# Patient Record
Sex: MALE | HISPANIC OR LATINO | Employment: FULL TIME | URBAN - METROPOLITAN AREA
[De-identification: names, ages, dates, MRNs, and addresses within clinical notes are randomized per-mention and may not be internally consistent; named-entity substitution may affect disease eponyms.]

---

## 2018-05-05 ENCOUNTER — HOSPITAL ENCOUNTER (OUTPATIENT)
Facility: MEDICAL CENTER | Age: 23
End: 2018-05-06
Attending: EMERGENCY MEDICINE | Admitting: OTOLARYNGOLOGY
Payer: COMMERCIAL

## 2018-05-05 ENCOUNTER — APPOINTMENT (OUTPATIENT)
Dept: RADIOLOGY | Facility: MEDICAL CENTER | Age: 23
End: 2018-05-05
Attending: EMERGENCY MEDICINE
Payer: COMMERCIAL

## 2018-05-05 DIAGNOSIS — D72.829 LEUKOCYTOSIS, UNSPECIFIED TYPE: ICD-10-CM

## 2018-05-05 DIAGNOSIS — G89.18 POST-OP PAIN: ICD-10-CM

## 2018-05-05 DIAGNOSIS — J02.9 ACUTE SORE THROAT: ICD-10-CM

## 2018-05-05 DIAGNOSIS — J36 PERITONSILLAR ABSCESS: ICD-10-CM

## 2018-05-05 LAB
ANION GAP SERPL CALC-SCNC: 14 MMOL/L (ref 0–11.9)
APTT PPP: 34 SEC (ref 24.7–36)
BASOPHILS # BLD AUTO: 0.4 % (ref 0–1.8)
BASOPHILS # BLD: 0.06 K/UL (ref 0–0.12)
BUN SERPL-MCNC: 11 MG/DL (ref 8–22)
CALCIUM SERPL-MCNC: 9.5 MG/DL (ref 8.5–10.5)
CHLORIDE SERPL-SCNC: 105 MMOL/L (ref 96–112)
CO2 SERPL-SCNC: 22 MMOL/L (ref 20–33)
CREAT SERPL-MCNC: 0.67 MG/DL (ref 0.5–1.4)
EOSINOPHIL # BLD AUTO: 0.01 K/UL (ref 0–0.51)
EOSINOPHIL NFR BLD: 0.1 % (ref 0–6.9)
ERYTHROCYTE [DISTWIDTH] IN BLOOD BY AUTOMATED COUNT: 39.5 FL (ref 35.9–50)
GLUCOSE SERPL-MCNC: 90 MG/DL (ref 65–99)
HCT VFR BLD AUTO: 41.8 % (ref 42–52)
HGB BLD-MCNC: 14.4 G/DL (ref 14–18)
IMM GRANULOCYTES # BLD AUTO: 0.06 K/UL (ref 0–0.11)
IMM GRANULOCYTES NFR BLD AUTO: 0.4 % (ref 0–0.9)
INR PPP: 1.11 (ref 0.87–1.13)
LYMPHOCYTES # BLD AUTO: 1.03 K/UL (ref 1–4.8)
LYMPHOCYTES NFR BLD: 6.4 % (ref 22–41)
MCH RBC QN AUTO: 31 PG (ref 27–33)
MCHC RBC AUTO-ENTMCNC: 34.4 G/DL (ref 33.7–35.3)
MCV RBC AUTO: 90.1 FL (ref 81.4–97.8)
MONOCYTES # BLD AUTO: 1.07 K/UL (ref 0–0.85)
MONOCYTES NFR BLD AUTO: 6.6 % (ref 0–13.4)
NEUTROPHILS # BLD AUTO: 13.99 K/UL (ref 1.82–7.42)
NEUTROPHILS NFR BLD: 86.1 % (ref 44–72)
NRBC # BLD AUTO: 0 K/UL
NRBC BLD-RTO: 0 /100 WBC
PLATELET # BLD AUTO: 383 K/UL (ref 164–446)
PMV BLD AUTO: 8.8 FL (ref 9–12.9)
POTASSIUM SERPL-SCNC: 3.8 MMOL/L (ref 3.6–5.5)
PROTHROMBIN TIME: 14 SEC (ref 12–14.6)
RBC # BLD AUTO: 4.64 M/UL (ref 4.7–6.1)
S PYO AG THROAT QL: NORMAL
SIGNIFICANT IND 70042: NORMAL
SITE SITE: NORMAL
SODIUM SERPL-SCNC: 141 MMOL/L (ref 135–145)
SOURCE SOURCE: NORMAL
WBC # BLD AUTO: 16.2 K/UL (ref 4.8–10.8)

## 2018-05-05 PROCEDURE — 501445 HCHG STAPLER, SKIN DISP: Performed by: OTOLARYNGOLOGY

## 2018-05-05 PROCEDURE — A9270 NON-COVERED ITEM OR SERVICE: HCPCS

## 2018-05-05 PROCEDURE — 700101 HCHG RX REV CODE 250

## 2018-05-05 PROCEDURE — 700111 HCHG RX REV CODE 636 W/ 250 OVERRIDE (IP)

## 2018-05-05 PROCEDURE — 96375 TX/PRO/DX INJ NEW DRUG ADDON: CPT

## 2018-05-05 PROCEDURE — 700102 HCHG RX REV CODE 250 W/ 637 OVERRIDE(OP): Performed by: OTOLARYNGOLOGY

## 2018-05-05 PROCEDURE — 99291 CRITICAL CARE FIRST HOUR: CPT

## 2018-05-05 PROCEDURE — 110454 HCHG SHELL REV 250: Performed by: OTOLARYNGOLOGY

## 2018-05-05 PROCEDURE — 80048 BASIC METABOLIC PNL TOTAL CA: CPT

## 2018-05-05 PROCEDURE — 700105 HCHG RX REV CODE 258: Performed by: OTOLARYNGOLOGY

## 2018-05-05 PROCEDURE — 700105 HCHG RX REV CODE 258: Performed by: EMERGENCY MEDICINE

## 2018-05-05 PROCEDURE — 85610 PROTHROMBIN TIME: CPT

## 2018-05-05 PROCEDURE — 500123 HCHG BOVIE, CONTROL W/BLADE: Performed by: OTOLARYNGOLOGY

## 2018-05-05 PROCEDURE — 36415 COLL VENOUS BLD VENIPUNCTURE: CPT

## 2018-05-05 PROCEDURE — 160048 HCHG OR STATISTICAL LEVEL 1-5: Performed by: OTOLARYNGOLOGY

## 2018-05-05 PROCEDURE — 85730 THROMBOPLASTIN TIME PARTIAL: CPT

## 2018-05-05 PROCEDURE — 501838 HCHG SUTURE GENERAL: Performed by: OTOLARYNGOLOGY

## 2018-05-05 PROCEDURE — 85025 COMPLETE CBC W/AUTO DIFF WBC: CPT

## 2018-05-05 PROCEDURE — 500122 HCHG BOVIE, BLADE: Performed by: OTOLARYNGOLOGY

## 2018-05-05 PROCEDURE — 87880 STREP A ASSAY W/OPTIC: CPT

## 2018-05-05 PROCEDURE — 96365 THER/PROPH/DIAG IV INF INIT: CPT

## 2018-05-05 PROCEDURE — 700111 HCHG RX REV CODE 636 W/ 250 OVERRIDE (IP): Performed by: OTOLARYNGOLOGY

## 2018-05-05 PROCEDURE — G0378 HOSPITAL OBSERVATION PER HR: HCPCS

## 2018-05-05 PROCEDURE — A9270 NON-COVERED ITEM OR SERVICE: HCPCS | Performed by: OTOLARYNGOLOGY

## 2018-05-05 PROCEDURE — 160036 HCHG PACU - EA ADDL 30 MINS PHASE I: Performed by: OTOLARYNGOLOGY

## 2018-05-05 PROCEDURE — 700111 HCHG RX REV CODE 636 W/ 250 OVERRIDE (IP): Performed by: EMERGENCY MEDICINE

## 2018-05-05 PROCEDURE — 160027 HCHG SURGERY MINUTES - 1ST 30 MINS LEVEL 2: Performed by: OTOLARYNGOLOGY

## 2018-05-05 PROCEDURE — 160009 HCHG ANES TIME/MIN: Performed by: OTOLARYNGOLOGY

## 2018-05-05 PROCEDURE — 502573 HCHG PACK, ENT: Performed by: OTOLARYNGOLOGY

## 2018-05-05 PROCEDURE — 96376 TX/PRO/DX INJ SAME DRUG ADON: CPT

## 2018-05-05 PROCEDURE — 160002 HCHG RECOVERY MINUTES (STAT): Performed by: OTOLARYNGOLOGY

## 2018-05-05 PROCEDURE — 303977 HCHG I & D

## 2018-05-05 PROCEDURE — 70491 CT SOFT TISSUE NECK W/DYE: CPT

## 2018-05-05 PROCEDURE — 700117 HCHG RX CONTRAST REV CODE 255: Performed by: EMERGENCY MEDICINE

## 2018-05-05 PROCEDURE — 88304 TISSUE EXAM BY PATHOLOGIST: CPT | Mod: 59

## 2018-05-05 PROCEDURE — 96374 THER/PROPH/DIAG INJ IV PUSH: CPT

## 2018-05-05 PROCEDURE — 160035 HCHG PACU - 1ST 60 MINS PHASE I: Performed by: OTOLARYNGOLOGY

## 2018-05-05 PROCEDURE — 700102 HCHG RX REV CODE 250 W/ 637 OVERRIDE(OP)

## 2018-05-05 PROCEDURE — 87081 CULTURE SCREEN ONLY: CPT

## 2018-05-05 PROCEDURE — 160038 HCHG SURGERY MINUTES - EA ADDL 1 MIN LEVEL 2: Performed by: OTOLARYNGOLOGY

## 2018-05-05 RX ORDER — ACETAMINOPHEN 325 MG/1
650 TABLET ORAL EVERY 4 HOURS PRN
Status: DISCONTINUED | OUTPATIENT
Start: 2018-05-05 | End: 2018-05-06 | Stop reason: HOSPADM

## 2018-05-05 RX ORDER — OXYCODONE HYDROCHLORIDE AND ACETAMINOPHEN 5; 325 MG/1; MG/1
TABLET ORAL
Status: COMPLETED
Start: 2018-05-05 | End: 2018-05-05

## 2018-05-05 RX ORDER — DEXAMETHASONE SODIUM PHOSPHATE 4 MG/ML
10 INJECTION, SOLUTION INTRA-ARTICULAR; INTRALESIONAL; INTRAMUSCULAR; INTRAVENOUS; SOFT TISSUE EVERY 8 HOURS
Status: DISPENSED | OUTPATIENT
Start: 2018-05-05 | End: 2018-05-06

## 2018-05-05 RX ORDER — MORPHINE SULFATE 4 MG/ML
4 INJECTION, SOLUTION INTRAMUSCULAR; INTRAVENOUS ONCE
Status: COMPLETED | OUTPATIENT
Start: 2018-05-05 | End: 2018-05-05

## 2018-05-05 RX ORDER — OXYCODONE HYDROCHLORIDE 10 MG/1
10 TABLET ORAL EVERY 4 HOURS PRN
Status: DISCONTINUED | OUTPATIENT
Start: 2018-05-05 | End: 2018-05-06 | Stop reason: HOSPADM

## 2018-05-05 RX ORDER — ONDANSETRON 2 MG/ML
4 INJECTION INTRAMUSCULAR; INTRAVENOUS ONCE
Status: COMPLETED | OUTPATIENT
Start: 2018-05-05 | End: 2018-05-05

## 2018-05-05 RX ORDER — OXYCODONE HYDROCHLORIDE 5 MG/1
5 TABLET ORAL EVERY 4 HOURS PRN
Status: DISCONTINUED | OUTPATIENT
Start: 2018-05-05 | End: 2018-05-06 | Stop reason: HOSPADM

## 2018-05-05 RX ORDER — MORPHINE SULFATE 4 MG/ML
4 INJECTION, SOLUTION INTRAMUSCULAR; INTRAVENOUS
Status: DISCONTINUED | OUTPATIENT
Start: 2018-05-05 | End: 2018-05-06 | Stop reason: HOSPADM

## 2018-05-05 RX ORDER — LIDOCAINE HYDROCHLORIDE AND EPINEPHRINE BITARTRATE 20; .01 MG/ML; MG/ML
10 INJECTION, SOLUTION SUBCUTANEOUS ONCE
Status: COMPLETED | OUTPATIENT
Start: 2018-05-05 | End: 2018-05-05

## 2018-05-05 RX ORDER — IBUPROFEN 600 MG/1
600 TABLET ORAL EVERY 6 HOURS PRN
Status: DISCONTINUED | OUTPATIENT
Start: 2018-05-05 | End: 2018-05-06 | Stop reason: HOSPADM

## 2018-05-05 RX ORDER — SODIUM CHLORIDE 9 MG/ML
1000 INJECTION, SOLUTION INTRAVENOUS ONCE
Status: COMPLETED | OUTPATIENT
Start: 2018-05-05 | End: 2018-05-05

## 2018-05-05 RX ORDER — DEXAMETHASONE SODIUM PHOSPHATE 10 MG/ML
10 INJECTION, SOLUTION INTRAMUSCULAR; INTRAVENOUS ONCE
Status: COMPLETED | OUTPATIENT
Start: 2018-05-05 | End: 2018-05-05

## 2018-05-05 RX ORDER — OXYCODONE HYDROCHLORIDE AND ACETAMINOPHEN 5; 325 MG/1; MG/1
1 TABLET ORAL EVERY 4 HOURS PRN
Status: DISCONTINUED | OUTPATIENT
Start: 2018-05-05 | End: 2018-05-05

## 2018-05-05 RX ORDER — ONDANSETRON 2 MG/ML
4 INJECTION INTRAMUSCULAR; INTRAVENOUS EVERY 8 HOURS PRN
Status: DISCONTINUED | OUTPATIENT
Start: 2018-05-05 | End: 2018-05-06 | Stop reason: HOSPADM

## 2018-05-05 RX ORDER — CEFTRIAXONE 2 G/1
2 INJECTION, POWDER, FOR SOLUTION INTRAMUSCULAR; INTRAVENOUS ONCE
Status: COMPLETED | OUTPATIENT
Start: 2018-05-05 | End: 2018-05-05

## 2018-05-05 RX ORDER — LIDOCAINE HYDROCHLORIDE AND EPINEPHRINE BITARTRATE 20; .01 MG/ML; MG/ML
INJECTION, SOLUTION SUBCUTANEOUS
Status: COMPLETED
Start: 2018-05-05 | End: 2018-05-05

## 2018-05-05 RX ORDER — SODIUM CHLORIDE, SODIUM LACTATE, POTASSIUM CHLORIDE, CALCIUM CHLORIDE 600; 310; 30; 20 MG/100ML; MG/100ML; MG/100ML; MG/100ML
INJECTION, SOLUTION INTRAVENOUS CONTINUOUS
Status: DISCONTINUED | OUTPATIENT
Start: 2018-05-05 | End: 2018-05-06 | Stop reason: HOSPADM

## 2018-05-05 RX ORDER — OXYMETAZOLINE HYDROCHLORIDE 0.05 G/100ML
SPRAY NASAL
Status: DISCONTINUED | OUTPATIENT
Start: 2018-05-05 | End: 2018-05-05 | Stop reason: HOSPADM

## 2018-05-05 RX ADMIN — MORPHINE SULFATE 4 MG: 4 INJECTION INTRAVENOUS at 08:08

## 2018-05-05 RX ADMIN — HYDROMORPHONE HYDROCHLORIDE 0.5 MG: 10 INJECTION, SOLUTION INTRAMUSCULAR; INTRAVENOUS; SUBCUTANEOUS at 14:30

## 2018-05-05 RX ADMIN — SODIUM CHLORIDE 1000 ML: 9 INJECTION, SOLUTION INTRAVENOUS at 08:09

## 2018-05-05 RX ADMIN — SODIUM CHLORIDE, POTASSIUM CHLORIDE, SODIUM LACTATE AND CALCIUM CHLORIDE: 600; 310; 30; 20 INJECTION, SOLUTION INTRAVENOUS at 20:12

## 2018-05-05 RX ADMIN — ONDANSETRON 4 MG: 2 INJECTION, SOLUTION INTRAMUSCULAR; INTRAVENOUS at 08:08

## 2018-05-05 RX ADMIN — DEXAMETHASONE SODIUM PHOSPHATE 10 MG: 10 INJECTION, SOLUTION INTRAMUSCULAR; INTRAVENOUS at 08:43

## 2018-05-05 RX ADMIN — HYDROMORPHONE HYDROCHLORIDE 0.5 MG: 10 INJECTION, SOLUTION INTRAMUSCULAR; INTRAVENOUS; SUBCUTANEOUS at 14:37

## 2018-05-05 RX ADMIN — LIDOCAINE HYDROCHLORIDE AND EPINEPHRINE BITARTRATE 10 ML: 20; .01 INJECTION, SOLUTION SUBCUTANEOUS at 10:00

## 2018-05-05 RX ADMIN — OXYCODONE AND ACETAMINOPHEN 2 TABLET: 5; 325 TABLET ORAL at 14:25

## 2018-05-05 RX ADMIN — CEFTRIAXONE SODIUM 2 G: 2 INJECTION, POWDER, FOR SOLUTION INTRAMUSCULAR; INTRAVENOUS at 08:08

## 2018-05-05 RX ADMIN — AMPICILLIN SODIUM AND SULBACTAM SODIUM 3 G: 2; 1 INJECTION, POWDER, FOR SOLUTION INTRAMUSCULAR; INTRAVENOUS at 18:17

## 2018-05-05 RX ADMIN — IOHEXOL 80 ML: 350 INJECTION, SOLUTION INTRAVENOUS at 09:39

## 2018-05-05 RX ADMIN — OXYCODONE HYDROCHLORIDE 5 MG: 10 TABLET ORAL at 18:23

## 2018-05-05 RX ADMIN — LIDOCAINE HYDROCHLORIDE AND EPINEPHRINE 10 ML: 20; 10 INJECTION, SOLUTION INFILTRATION; PERINEURAL at 10:00

## 2018-05-05 RX ADMIN — DEXAMETHASONE SODIUM PHOSPHATE 10 MG: 4 INJECTION, SOLUTION INTRAMUSCULAR; INTRAVENOUS at 22:00

## 2018-05-05 ASSESSMENT — PATIENT HEALTH QUESTIONNAIRE - PHQ9
1. LITTLE INTEREST OR PLEASURE IN DOING THINGS: NOT AT ALL
SUM OF ALL RESPONSES TO PHQ9 QUESTIONS 1 AND 2: 0
2. FEELING DOWN, DEPRESSED, IRRITABLE, OR HOPELESS: NOT AT ALL

## 2018-05-05 ASSESSMENT — PAIN SCALES - GENERAL
PAINLEVEL_OUTOF10: 0
PAINLEVEL_OUTOF10: 6
PAINLEVEL_OUTOF10: 6
PAINLEVEL_OUTOF10: 10
PAINLEVEL_OUTOF10: 8
PAINLEVEL_OUTOF10: 0
PAINLEVEL_OUTOF10: 9
PAINLEVEL_OUTOF10: 5
PAINLEVEL_OUTOF10: 8
PAINLEVEL_OUTOF10: 3

## 2018-05-05 ASSESSMENT — PAIN SCALES - WONG BAKER
WONGBAKER_NUMERICALRESPONSE: HURTS JUST A LITTLE BIT
WONGBAKER_NUMERICALRESPONSE: HURTS A LITTLE MORE

## 2018-05-05 ASSESSMENT — LIFESTYLE VARIABLES
EVER_SMOKED: YES
DO YOU DRINK ALCOHOL: NO

## 2018-05-05 NOTE — OR NURSING
Pt awake, no complaints of nausea, tolerating fluids. States pain is tolerable. Unable to locate family, Pt calling them himself. All belongings accounted for, all questions answered at this time. Report called.

## 2018-05-05 NOTE — ED TRIAGE NOTES
"Keith Elaine  Chief Complaint   Patient presents with   • Sore Throat     s/s since last thursday. pain rated at 10/10. pt report severe swelling and unable to talk d/t pain.    • Difficulty Swallowing     pt report difficulty swallowing d/t pain.      Pt ambulatory to triage with above complaint. Pt using pen and paper to communicate in triage. Pt states, \"it started last week with a cold and by Thursday last week the swelling began\". Pt given emesis bag in triage to spit in d/t inability to swallow.     /60   Pulse 85   Temp 35.9 °C (96.7 °F)   Resp 14   Ht 1.676 m (5' 6\")   Wt 70.7 kg (155 lb 13.8 oz)   SpO2 97%   BMI 25.16 kg/m²     Pt informed of triage process and encouraged to notify staff of any changes or concerns. Pt verbalized understanding of instructions. Pt placed back in lobby.     "

## 2018-05-05 NOTE — OP REPORT
DATE OF SERVICE:  05/05/2018    PREOPERATIVE DIAGNOSIS:  Right tonsil abscess.    POSTOPERATIVE DIAGNOSIS:  Tonsillitis.    PROCEDURE:  Bilateral tonsillectomy.    SURGEON:  Miguel Angel Sparks MD    ASSISTANT:  None.    ANESTHESIA:  General endotracheal.    ESTIMATED BLOOD LOSS:  25 mL.    COMPLICATIONS:  None.    SPECIMENS:  Right and left tonsils sent in RPMI in formalin.    COMPLICATIONS:  None.    INDICATIONS:  A 22-year-old gentleman who has had 10 days of severe sore   throat.  He presented to the emergency room this morning.  CT scan revealed a   3 cm hypodensity within the right tonsil.  I attempted drainage of this at the   bedside, but I was unable to encounter pus.  After discussing the risks,   benefits, and alternatives, he elected to undergo the above procedure.  I did   consent them for incision and drainage of right peritonsillar abscess as well   as possible bilateral quinsy tonsillectomy.  Risks, benefits, and alternatives   discussed including bleeding, infection, recurrence, need for further   surgery, injury to the teeth, lips, gums and tongue and to the carotid artery,   pain afterwards.    FINDINGS:  The right tonsil was very enlarged.  There was some necrosis on the   medial aspect of it.  When I was unable to express pus, using an 18-gauge   needle on a 10 mL syringe, I continued my incision which I made previously in   the emergency room.  I was able to get into the plane between the tonsil and   the constrictor muscles.  This was very adherent and scarred and eventually I   was able to encounter a very large empty pocket just posterior to the tonsil,   which I think corresponded to probably the hypodensity on the CT, but I did   not encounter tim pus.  At this point, I elected to remove the entire tonsil   as the left tonsil as well.  I did sent them fresh for lymphoma workup and we   discussed how to send it with Dr. Pinky Kaye who was on call.    DESCRIPTION OF PROCEDURE:  Patient  was brought to the operating room,   correctly identified as the patient, intubated by anesthesia without   difficulty, turned 90 degrees.  He has already been given antibiotics in the   emergency room.  He was draped in usual sterile fashion.  A time-out was   performed, I could see my devan on his right neck.  I began by using #3 mouth   gag to open the mouth.  I placed him in suspension on towels.  The right   tonsil was very enlarged and bulging.  The uvula was edematous.  I began by   exploring my incision using a Schnidt.  I could not encounter any pus, so I   used an 18-gauge needle on a 10 mL syringe and the soft palate and the tonsil   itself without encountering any pus. A t this point, I elected to proceed with   her quinsy tonsillectomy since the CT findings were still consistent with   abscess.  I continued my incision inferiorly and brought it around   superomedially, and then I was able to encounter the plane between the   constrictors on the tonsil.  This is very fibrotic and scarred in.  I   continued dissecting out and then I got into a pocket just posterior to the   tonsil that likely corresponds to the hypodensity on the scan, but there was   no pus within it.  I then continued to resect the tonsil.  The tonsil came   out.  There was some residual tonsillar tissue left superiorly as well as   inferiorly, but it was very oozy, and I did not feel like there was a residual   abscess within this.  I sent this to pathology.  We called Dr. Kaye and   she started to instruct us on how to save it for flow cytometry as well as   traditional histopathology.  I palpated, it did not feel like there was a 3 cm   abscess present.  Still, I think the hypodensity corresponded to an area of   just necrosis within the tonsil itself or just posterior to the tonsil.  I   then elected to proceed with left tonsillectomy.  I grasped the left tonsil,   dissected on the usual plane without difficulty using  electrocautery and a   coag of 15.  I sent this to pathology as well.  The uvula was quite edematous   preoperatively and it continued to be throughout the operation.  I then   obtained hemostasis using the suction cautery on a coag of 15 and 20.  I then   placed some Surgiflo in the tonsillar fossa.  I rubbed the tonsillar fossa   multiple times, took him in and out of suspension and any residual oozing was   suction cauterized.  Patient was then awakened from anesthesia and was taken   to recovery room in stable condition.  All counts were correct.  There were no   complications.  His teeth were in the same postoperative as preoperative   condition.       ____________________________________     MD LIANG Salcido / ISSA    DD:  05/05/2018 13:58:32  DT:  05/05/2018 14:23:11    D#:  [id number]  Job#:  [id number]

## 2018-05-05 NOTE — CONSULTS
"Date of Service:  5/5/18    PCP: No primary care provider on file.    CC:  Right PTA     HPI: This is a 22 y.o. male with 10 days of sore throat. No rx. No prev tonsillitis.  Smokes and drinks occasionally, no drugs.   Pain with swallowing, has some SOB, able to lay flat. Voice is muffled. Had water last night, is NPO  Received decadron, ctx      ROS: As above. The remainder of a complete review of systems is negative in all systems except as noted.    PMHx:  Active Ambulatory Problems     Diagnosis Date Noted   • No Active Ambulatory Problems     Resolved Ambulatory Problems     Diagnosis Date Noted   • No Resolved Ambulatory Problems     No Additional Past Medical History       SHx:  Social History     Social History   • Marital status: Single     Spouse name: N/A   • Number of children: N/A   • Years of education: N/A     Occupational History   • Not on file.     Social History Main Topics   • Smoking status: Not on file   • Smokeless tobacco: Not on file   • Alcohol use Not on file   • Drug use: Unknown   • Sexual activity: Not on file     Other Topics Concern   • Not on file     Social History Narrative   • No narrative on file       FHx:  family history is not on file.    Allergies:  No Known Allergies    Medications:  No current facility-administered medications on file prior to encounter.      No current outpatient prescriptions on file prior to encounter.       Objective Exam:  Vitals:    05/05/18 0629 05/05/18 0633 05/05/18 0843   BP: 100/60  112/52   Pulse: 85  76   Resp: 14  15   Temp: 35.9 °C (96.7 °F)     SpO2: 97%  98%   Weight:  70.7 kg (155 lb 13.8 oz)    Height: 1.676 m (5' 6\")         General: NAD, breathing comfortably, no stridor  Neck flat full rom   oP: 3 finger trismus, large bulge in right tonsil   FOM normal   A+O x 3    Laboratory--reviewed personally and are as follows:  Lab Results   Component Value Date/Time    WBC 16.2 (H) 05/05/2018 08:11 AM    RBC 4.64 (L) 05/05/2018 08:11 AM    " HEMOGLOBIN 14.4 05/05/2018 08:11 AM    HEMATOCRIT 41.8 (L) 05/05/2018 08:11 AM    MCV 90.1 05/05/2018 08:11 AM    MCH 31.0 05/05/2018 08:11 AM    MCHC 34.4 05/05/2018 08:11 AM    MPV 8.8 (L) 05/05/2018 08:11 AM    NEUTSPOLYS 86.10 (H) 05/05/2018 08:11 AM    LYMPHOCYTES 6.40 (L) 05/05/2018 08:11 AM    MONOCYTES 6.60 05/05/2018 08:11 AM    EOSINOPHILS 0.10 05/05/2018 08:11 AM    BASOPHILS 0.40 05/05/2018 08:11 AM      Lab Results   Component Value Date/Time    SODIUM 141 05/05/2018 08:11 AM    POTASSIUM 3.8 05/05/2018 08:11 AM    CHLORIDE 105 05/05/2018 08:11 AM    CO2 22 05/05/2018 08:11 AM    GLUCOSE 90 05/05/2018 08:11 AM    BUN 11 05/05/2018 08:11 AM    CREATININE 0.67 05/05/2018 08:11 AM      Lab Results   Component Value Date/Time    PROTHROMBTM 14.0 05/05/2018 08:11 AM    INR 1.11 05/05/2018 08:11 AM        Radiology  CT neck reviewed   Right tonsil abscess 3cm  Right edema of airway   Retropharyngeal space looks ok, I think the bubbles are due to the swelling, not spreading infection     Procedure:  After verbal and written consent and discussion of risks, benefits, alternatives consent signed  Risks - bleeding infection recurrence, need for further treatment, injury to teeth lips tongue gums, injury to carotid  Cetacaine sprayed on throat,   1cc of 2% lido with [id number] epi injected in right soft palate   Multiple passes with 20g needle unable to locate pus  11 blade used to incise right soft palate, merlyn used to spread - no pus   Tolerated well ebl 15cc     MDM:  22 y.o. male here with Right peritonsillar / tonsillar abscess   Unable to drain in ER  Will take to OR for I+D right PTA, possible faiza tonsillectomy  Risks, benefits, alternatives discussed  Consent signed   Will need admission for iv unasyn, decadron 10q8 x 3 doses   Npo

## 2018-05-05 NOTE — OR SURGEON
Immediate Post OP Note    PreOp Diagnosis: Right tonsil abscess    PostOp Diagnosis: Right tonsillitis     Procedure(s):  Bilateral TONSILLECTOMY - Wound Class: Clean Contaminated    Surgeon(s):  Miguel Angel Sparks M.D.    Anesthesiologist/Type of Anesthesia:  Anesthesiologist: Tad James M.D./General    Surgical Staff:  Circulator: Zachery Grande R.N.  Scrub Person: Demetrius Cuadra    Specimens removed if any:  Bilateral tonsils, sent in RPMI and formalin     Estimated Blood Loss: 25cc    Findings: Large right tonsil with cystic space posterior to it, no pus encountered     Complications: none        5/5/2018 1:50 PM Miguel Angel Sparks M.D.

## 2018-05-05 NOTE — ED PROVIDER NOTES
"ED Provider Note    Scribed for Jarret Bruner M.D. by Shivam Mazariegos. 5/5/2018  6:59 AM    Primary care provider: None noted  Means of arrival: Walk in  History obtained from: Patient  History limited by: None    CHIEF COMPLAINT  Chief Complaint   Patient presents with   • Sore Throat     s/s since last thursday. pain rated at 10/10. pt report severe swelling and unable to talk d/t pain.    • Difficulty Swallowing     pt report difficulty swallowing d/t pain.        HPI  Keith Elaine is a 22 y.o. male who presents to the Emergency Department complaining of sore throat and associated difficulty swallowing onset approximately 9 days ago. These symptoms are causing difficulty speaking secondary to the pain. He rates the pain as a 10/10 in severity. He does not report any significant alleviating factors to the pain. He otherwise does not report any other associated symptoms at this time. He does not report any associated shortness of breath.       REVIEW OF SYSTEMS  Pertinent positives include sore throat, difficulty swallowing.   Pertinent negatives include no shortness of breath.    All other systems reviewed and negative.    C    PAST MEDICAL HISTORY   None reported    SURGICAL HISTORY  patient denies any surgical history    SOCIAL HISTORY  None noted    FAMILY HISTORY  No pertinent family history reported.     CURRENT MEDICATIONS  Home Medications     Reviewed by Kelly Caro R.N. (Registered Nurse) on 05/05/18 at 0719  Med List Status: <None>   Medication Last Dose Status        Patient Chivo Taking any Medications                       ALLERGIES  No Known Allergies    PHYSICAL EXAM  VITAL SIGNS: /60   Pulse 85   Temp 35.9 °C (96.7 °F)   Resp 14   Ht 1.676 m (5' 6\")   Wt 70.7 kg (155 lb 13.8 oz)   SpO2 97%   BMI 25.16 kg/m²   Nursing note and vitals reviewed.  Constitutional: Well-developed and well-nourished. Uncomfortable appearing male.   HENT: Head is normocephalic and atraumatic. " Posterior pharynx is erythematous. Right sided tonsillar swelling with leftward uvula deviation. No exudate. Muffled voice.   Eyes: Pupils are equal, round, and reactive to light. Conjunctiva are normal.   Cardiovascular: Normal rate and regular rhythm. No murmur heard. Normal radial pulses.  Pulmonary/Chest: Breath sounds normal. No wheezes or rales.   Abdominal: Soft and non-tender. No distention    Musculoskeletal: Extremities exhibit normal range of motion without edema or tenderness.   Neurological: Awake, alert and oriented to person, place, and time. No focal deficits noted.  Skin: Skin is warm and dry. No rash.   Psychiatric: Normal mood and affect. Appropriate for clinical situation      DIAGNOSTIC STUDIES / PROCEDURES    LABS  Results for orders placed or performed during the hospital encounter of 05/05/18   RAPID STREP, CULT IF INDICATED (CULTURE IF NEGATIVE)   Result Value Ref Range    Significant Indicator NEG     Source THRT     Site THROAT     Rapid Strep Screen       Negative for Group A streptococcus.  A negative result may be obtained if the specimen is  inadequate or antigen concentration is below the  sensitivity of the test. This negative test will be followed  up with a culture as requested.     CBC WITH DIFFERENTIAL   Result Value Ref Range    WBC 16.2 (H) 4.8 - 10.8 K/uL    RBC 4.64 (L) 4.70 - 6.10 M/uL    Hemoglobin 14.4 14.0 - 18.0 g/dL    Hematocrit 41.8 (L) 42.0 - 52.0 %    MCV 90.1 81.4 - 97.8 fL    MCH 31.0 27.0 - 33.0 pg    MCHC 34.4 33.7 - 35.3 g/dL    RDW 39.5 35.9 - 50.0 fL    Platelet Count 383 164 - 446 K/uL    MPV 8.8 (L) 9.0 - 12.9 fL    Neutrophils-Polys 86.10 (H) 44.00 - 72.00 %    Lymphocytes 6.40 (L) 22.00 - 41.00 %    Monocytes 6.60 0.00 - 13.40 %    Eosinophils 0.10 0.00 - 6.90 %    Basophils 0.40 0.00 - 1.80 %    Immature Granulocytes 0.40 0.00 - 0.90 %    Nucleated RBC 0.00 /100 WBC    Neutrophils (Absolute) 13.99 (H) 1.82 - 7.42 K/uL    Lymphs (Absolute) 1.03 1.00 -  4.80 K/uL    Monos (Absolute) 1.07 (H) 0.00 - 0.85 K/uL    Eos (Absolute) 0.01 0.00 - 0.51 K/uL    Baso (Absolute) 0.06 0.00 - 0.12 K/uL    Immature Granulocytes (abs) 0.06 0.00 - 0.11 K/uL    NRBC (Absolute) 0.00 K/uL   BASIC METABOLIC PANEL   Result Value Ref Range    Sodium 141 135 - 145 mmol/L    Potassium 3.8 3.6 - 5.5 mmol/L    Chloride 105 96 - 112 mmol/L    Co2 22 20 - 33 mmol/L    Glucose 90 65 - 99 mg/dL    Bun 11 8 - 22 mg/dL    Creatinine 0.67 0.50 - 1.40 mg/dL    Calcium 9.5 8.5 - 10.5 mg/dL    Anion Gap 14.0 (H) 0.0 - 11.9   APTT   Result Value Ref Range    APTT 34.0 24.7 - 36.0 sec   PROTHROMBIN TIME   Result Value Ref Range    PT 14.0 12.0 - 14.6 sec    INR 1.11 0.87 - 1.13   ESTIMATED GFR   Result Value Ref Range    GFR If African American >60 >60 mL/min/1.73 m 2    GFR If Non African American >60 >60 mL/min/1.73 m 2      All labs reviewed by me.    RADIOLOGY  CT-SOFT TISSUE NECK WITH   Final Result      1.  Large right peritonsillar abscess measuring maximum of 3.7 cm      2.  Significant narrowing of the airway at the level of the oropharynx with associated mass effect upon the right side of the airway and shift to the left.      3.  Caudal extension of infection/inflammatory change including gas bubbles into the prevertebral space extending caudally to the level of the cricoid bone/C6      4.  Bilateral jugular chain reactive adenopathy      Findings were discussed with MARLENE RUTHERFORD on 5/5/2018 0940 AM.        The radiologist's interpretation of all radiological studies have been reviewed by me.       COURSE & MEDICAL DECISION MAKING  Nursing notes, VS, PMSFHx reviewed in chart.     Review of past medical records shows the patient has no prior ED visits.      6:59 AM - Patient seen and examined at bedside. Informed patient he has a peritonsillar abscess. Discussed plan of care which includes CT evaluation. Patient understands and agrees. Patient will be treated with morphine 4 mg, zofran 4 mg,  decadron 10 mg, rocephin 2 g. Ordered CT soft tissue neck, CBC, BMP, APTT, PT, rapid strep, beta strep screen to evaluate his symptoms. The differential diagnoses include but are not limited to: peritonsillar abscess, tonsillitis.  The patient will be resuscitated with IV fluids, as he will be NPO. He has had poor oral intake due to throat pain.      Due to concern for the size of the abscess and potential for airway compromise I feel that drainage by otolaryngology is most appropriate. Both for adequate drainage and for airway protection.    9:43 AM Paged ENT    9:46 AM - I discussed the patient's case and the above findings with Dr. Sparks (ENT) who will come see the patient.     10:06 AM Patient reevaluated at bedside. Dr. Sparks (ENT) is at bedside evaluating the patient.    Response to IV fluids: Improved hydration status.     11:16 AM Dr. Sparks continues at the bedside.    11:28 AM per nursing ENT plastic patient to the OR for incision and drainage.    White blood cell count is elevated at 16. Consistent with peritonsillar abscess. Patient has been treated with IV antibiotics and Decadron in the emergency department.    CRITICAL CARE  I provided critical care services, which included medication orders, frequent reevaluations of the patient's condition and response to treatment, ordering and reviewing test results, and discussing the case with consultant.  The critical care time associated with the care of the patient was 35 minutes. Review chart for interventions. This time is exclusive of any other billable procedures.      FINAL IMPRESSION  1. Peritonsillar abscess    2. Acute sore throat    3. Leukocytosis, unspecified type         I, Shivam Mazariegos (Carmen), fang scribing for, and in the presence of, Jarret Bruner M.D..    Electronically signed by: Shivam Mazariegos (Carmen), 5/5/2018    IJarret M.D. personally performed the services described in this documentation, as scribed by Shivam  CORY Mazariegos in my presence, and it is both accurate and complete.    The note accurately reflects work and decisions made by me.  Jarret Bruner  5/5/2018  11:29 AM

## 2018-05-05 NOTE — PROCEDURES
DATE OF SERVICE:  05/05/2018    PREPROCEDURE DIAGNOSIS:  Right tonsillar abscess.    POSTPROCEDURE DIAGNOSIS:  Right tonsillar abscess.    PROCEDURE:  Flexible laryngoscopy.    SURGEON:  Miguel Angel Sparks MD    ASSISTANT:  None.    ANESTHESIA:  None.    COMPLICATIONS:  None,    ESTIMATED BLOOD LOSS:  None.    INDICATIONS:  This is a 22-year-old who has a CT finding concerning for a   large right tonsil abscess multiple attempts to drain this were unsuccessful   at the bedside.  CT did reveal edema of the airway and I wanted to directly   observe it.    PROCEDURE IN DETAIL:  After verbal consent, a flexible laryngoscope was   introduced through the left nostril.  There is a severe right septal   deviation.  There was a small adenoid.    FINDINGS:  The right tonsil was very enlarged.  The base of the tongue   appeared normal.  Epiglottis was normal.  There was some edema of the right   arytenoid, but the glottis appeared normal.  The left side of the larynx   looked normal.  Patient tolerated the procedure well.  There were no   complications.       ____________________________________     Miguel Angel Sparks MD    JCM / NTS    DD:  05/05/2018 11:15:41  DT:  05/05/2018 11:43:03    D#:  [id number]  Job#:  [id number]

## 2018-05-05 NOTE — PROGRESS NOTES
Pt arrived to T419 via gurney, ambulated to bathroom, steady, then to bed, oriented x4, Pt rates pain 8 out of 10 in throat, rest encouraged. Pt - N/V. + BS, PTA BM. Pt up with standby assist.  in use, SCD's on, care packet provided and explanation given. Labs noted, assessment complete. Pt updated on POC. Pt educated to use call light when in need of assisstance. Bed locked and in lowest position. All needs met at this time, call light within reach, will continue to monitor.

## 2018-05-05 NOTE — ED NOTES
Med rec complete per pt at bedside   Pt denies taking OTC and prescription medications   Pt states he finished an ABX course of Penicillin about 2 weeks ago

## 2018-05-05 NOTE — ED NOTES
Pt resting in bed. Swab sent to lab. [place] fall risk assessment completed. Interventions in place for patient safety.

## 2018-05-06 ENCOUNTER — APPOINTMENT (OUTPATIENT)
Dept: RADIOLOGY | Facility: MEDICAL CENTER | Age: 23
End: 2018-05-06
Attending: OTOLARYNGOLOGY
Payer: COMMERCIAL

## 2018-05-06 VITALS
RESPIRATION RATE: 17 BRPM | BODY MASS INDEX: 25.05 KG/M2 | DIASTOLIC BLOOD PRESSURE: 53 MMHG | SYSTOLIC BLOOD PRESSURE: 102 MMHG | HEIGHT: 66 IN | WEIGHT: 155.87 LBS | HEART RATE: 51 BPM | TEMPERATURE: 97.1 F | OXYGEN SATURATION: 96 %

## 2018-05-06 LAB
ERYTHROCYTE [DISTWIDTH] IN BLOOD BY AUTOMATED COUNT: 38.7 FL (ref 35.9–50)
HCT VFR BLD AUTO: 39.9 % (ref 42–52)
HGB BLD-MCNC: 13.5 G/DL (ref 14–18)
MCH RBC QN AUTO: 30.6 PG (ref 27–33)
MCHC RBC AUTO-ENTMCNC: 33.8 G/DL (ref 33.7–35.3)
MCV RBC AUTO: 90.5 FL (ref 81.4–97.8)
PLATELET # BLD AUTO: 355 K/UL (ref 164–446)
PMV BLD AUTO: 8.7 FL (ref 9–12.9)
RBC # BLD AUTO: 4.41 M/UL (ref 4.7–6.1)
WBC # BLD AUTO: 12.1 K/UL (ref 4.8–10.8)

## 2018-05-06 PROCEDURE — 700117 HCHG RX CONTRAST REV CODE 255: Performed by: OTOLARYNGOLOGY

## 2018-05-06 PROCEDURE — 700105 HCHG RX REV CODE 258: Performed by: OTOLARYNGOLOGY

## 2018-05-06 PROCEDURE — A9270 NON-COVERED ITEM OR SERVICE: HCPCS | Performed by: OTOLARYNGOLOGY

## 2018-05-06 PROCEDURE — 700102 HCHG RX REV CODE 250 W/ 637 OVERRIDE(OP): Performed by: OTOLARYNGOLOGY

## 2018-05-06 PROCEDURE — 85027 COMPLETE CBC AUTOMATED: CPT

## 2018-05-06 PROCEDURE — 700111 HCHG RX REV CODE 636 W/ 250 OVERRIDE (IP): Performed by: OTOLARYNGOLOGY

## 2018-05-06 PROCEDURE — 70491 CT SOFT TISSUE NECK W/DYE: CPT

## 2018-05-06 PROCEDURE — G0378 HOSPITAL OBSERVATION PER HR: HCPCS

## 2018-05-06 PROCEDURE — 96376 TX/PRO/DX INJ SAME DRUG ADON: CPT

## 2018-05-06 PROCEDURE — 31575 DIAGNOSTIC LARYNGOSCOPY: CPT

## 2018-05-06 PROCEDURE — 96366 THER/PROPH/DIAG IV INF ADDON: CPT

## 2018-05-06 PROCEDURE — 36415 COLL VENOUS BLD VENIPUNCTURE: CPT

## 2018-05-06 RX ORDER — AMOXICILLIN AND CLAVULANATE POTASSIUM 875; 125 MG/1; MG/1
1 TABLET, FILM COATED ORAL 2 TIMES DAILY
Qty: 18 TAB | Refills: 0 | Status: SHIPPED | OUTPATIENT
Start: 2018-05-06 | End: 2018-05-15

## 2018-05-06 RX ORDER — OXYCODONE HYDROCHLORIDE 5 MG/1
5 TABLET ORAL EVERY 4 HOURS PRN
Qty: 30 TAB | Refills: 0 | Status: SHIPPED | OUTPATIENT
Start: 2018-05-06 | End: 2018-05-13

## 2018-05-06 RX ADMIN — AMPICILLIN SODIUM AND SULBACTAM SODIUM 3 G: 2; 1 INJECTION, POWDER, FOR SOLUTION INTRAMUSCULAR; INTRAVENOUS at 05:32

## 2018-05-06 RX ADMIN — OXYCODONE HYDROCHLORIDE 5 MG: 10 TABLET ORAL at 07:32

## 2018-05-06 RX ADMIN — AMPICILLIN SODIUM AND SULBACTAM SODIUM 3 G: 2; 1 INJECTION, POWDER, FOR SOLUTION INTRAMUSCULAR; INTRAVENOUS at 00:07

## 2018-05-06 RX ADMIN — SODIUM CHLORIDE, POTASSIUM CHLORIDE, SODIUM LACTATE AND CALCIUM CHLORIDE: 600; 310; 30; 20 INJECTION, SOLUTION INTRAVENOUS at 06:36

## 2018-05-06 RX ADMIN — IOHEXOL 100 ML: 350 INJECTION, SOLUTION INTRAVENOUS at 12:57

## 2018-05-06 RX ADMIN — DEXAMETHASONE SODIUM PHOSPHATE 10 MG: 4 INJECTION, SOLUTION INTRAMUSCULAR; INTRAVENOUS at 05:32

## 2018-05-06 RX ADMIN — AMPICILLIN SODIUM AND SULBACTAM SODIUM 3 G: 2; 1 INJECTION, POWDER, FOR SOLUTION INTRAMUSCULAR; INTRAVENOUS at 12:09

## 2018-05-06 ASSESSMENT — PAIN SCALES - GENERAL
PAINLEVEL_OUTOF10: 2
PAINLEVEL_OUTOF10: 2
PAINLEVEL_OUTOF10: 4
PAINLEVEL_OUTOF10: 2

## 2018-05-06 NOTE — DISCHARGE INSTRUCTIONS
Discharge Instructions    Discharged to home by car with relative. Discharged via wheelchair, hospital escort: Yes.  Special equipment needed: Not Applicable    Be sure to schedule a follow-up appointment with your primary care doctor or any specialists as instructed.     Discharge Plan:   Diet Plan: Discussed  Activity Level: Discussed  Smoking Cessation Offered: Patient Counseled  Confirmed Follow up Appointment: Patient to Call and Schedule Appointment  Confirmed Symptoms Management: Discussed  Medication Reconciliation Updated: Yes  Influenza Vaccine Indication: Patient Refuses    I understand that a diet low in cholesterol, fat, and sodium is recommended for good health. Unless I have been given specific instructions below for another diet, I accept this instruction as my diet prescription.   Other diet: Low fiber GI soft     Special Instructions: None    · Is patient discharged on Warfarin / Coumadin?   No     Depression / Suicide Risk    As you are discharged from this [facility] facility, it is important to learn how to keep safe from harming yourself.    Recognize the warning signs:  · Abrupt changes in personality, positive or negative- including increase in energy   · Giving away possessions  · Change in eating patterns- significant weight changes-  positive or negative  · Change in sleeping patterns- unable to sleep or sleeping all the time   · Unwillingness or inability to communicate  · Depression  · Unusual sadness, discouragement and loneliness  · Talk of wanting to die  · Neglect of personal appearance   · Rebelliousness- reckless behavior  · Withdrawal from people/activities they love  · Confusion- inability to concentrate     If you or a loved one observes any of these behaviors or has concerns about self-harm, here's what you can do:  · Talk about it- your feelings and reasons for harming yourself  · Remove any means that you might use to hurt yourself (examples: pills, rope, extension cords,  firearm)  · Get professional help from the community (Mental Health, Substance Abuse, psychological counseling)  · Do not be alone:Call your Safe Contact- someone whom you trust who will be there for you.  · Call your local CRISIS HOTLINE 362-6534 or 294-385-1238  · Call your local Children's Mobile Crisis Response Team [place] (849) 138-4961 or www.dotHIV  · Call the toll free National Suicide Prevention Hotlines   · National Suicide Prevention Lifeline 758-558-UEBK (8059)  · [employer] Hope Line Network 800-SUICIDE (395-9271)        Tonsillectomy, Adult, Care After  Refer to this sheet in the next few weeks. These instructions provide you with information on caring for yourself after your procedure. Your health care provider may also give you more specific instructions. Your treatment has been planned according to current medical practices, but problems sometimes occur. Call your health care provider if you have any problems or questions after your procedure.  WHAT TO EXPECT AFTER THE PROCEDURE  After your procedure, it is typical to have the following:  · Your tongue will be numb, and your sense of taste will be reduced.  · Your swallowing will be difficult and painful.  · Your jaw may hurt or make a clicking noise when you yawn or chew.  · Liquids that you drink may leak out of your nose.  · Your voice may sound muffled.  · The area at the middle of the roof of your mouth (uvula) may be very swollen.  · You may have a constant cough and need to clear mucus and phlegm from your throat.  HOME CARE INSTRUCTIONS   · Get proper rest, keeping your head elevated at all times.  · Drink plenty of fluids. This reduces pain and hastens the healing process.  · Take medicines only as directed by your health care provider.  · Soft and cold foods, such as gelatin, sherbet, ice cream, frozen ice pops, and cold drinks, are usually the easiest to eat. Several days after surgery, you will be able to eat more solid  food.  · Avoid mouthwashes and gargles.  · Avoid contact with people who have upper respiratory infections, such as colds and sore throats.  SEEK MEDICAL CARE IF:   · You have increasing pain that is not controlled with medicines.  · You have a fever.  · You have a rash.  · You feel light-headed or faint.  · You are unable to swallow even small amounts of liquid or saliva.  · Your urine is becoming very dark.  SEEK IMMEDIATE MEDICAL CARE IF:   · You have difficulty breathing.  · You experience side effects or allergic reactions to medicines.  · You bleed bright red blood from your throat, or you vomit bright red blood.     This information is not intended to replace advice given to you by your health care provider. Make sure you discuss any questions you have with your health care provider.     Diet Following Tonsillectomy, Adult  A tonsillectomy is a surgery to remove your tonsils. After a tonsillectomy you should eat foods that are easy to swallow and gentle on the throat. This makes recovery easier.  Follow the diet guidelines on this sheet for 1-2 weeks or until any pain from the surgery is completely gone.  WHAT DO I NEED TO KNOW ABOUT THIS DIET?  In the first 24 hours after surgery:  · Do not eat any food.  · Do not drink citrus juices or liquids that are cloudy.  · You may drink liquids that are clear, such as water, chicken broth, apple juice, and lemon-lime soda that has been set out to remove the carbonation.  After the first 24 hours:  · You may only eat soft foods.  · You may drink any liquid except citrus juices. For example, do not drink orange juice.  · Do not eat foods that are hard or that have a hard crust.  · Do not eat hot, spicy, or highly seasoned foods.  While you are on this diet:  · Cut foods into small pieces and chew them well.  · Drink several glasses of warm water daily.  · Consider drinking a liquid nutritional supplement, such as a liquid nutrition drink. Your health care provider can  give you recommendations.  WHICH FOODS CAN I EAT?   Grains  Soft bread. Soggy waffles or Spanish toast without crust and soaked in syrup. Pancakes. Oatmeal or other creamy cereal. Soggy, cold cereal. Pasta noodles.  Vegetables  Cooked vegetables. Mashed potatoes.  Fruits  Applesauce. Bananas. Canned fruit. Watermelon without seeds.  Meats and Other Protein Sources  Hot dogs. Hamburger. Tender, moist meat. Tuna. Scrambled or poached eggs.  Dairy  Milk. Smooth yogurt. Cottage cheese. Processed cheeses.  Beverages  Milk. Juices without seeds. Soda without carbonation.  Sweets and Desserts  Custard. Pudding. Ice cream. Malts. Shakes. Popsicles.  Other  Soup. Macaroni and cheese. Smooth nut butter, such as smooth peanut butter. Smooth peanut butter and jelly sandwiches without crust.  The items listed above may not be a complete list of recommended foods or beverages. Ask your dietitian for more options.  WHICH FOODS ARE NOT RECOMMENDED?  Grains  Toast. Crispy waffles. Crunchy, cold cereal. Crackers. Pretzels. Popcorn. Chips. Any grain that is dry, hard, or has a hard crust.  Vegetables  Any raw vegetables.  Fruits  All citrus fruits. Most fresh fruits, including oranges, apples, and melon.  Meats and Other Protein Sources  Tough, dry meat. Poultry. Fish. Nuts. Crunchy peanut butter or other crunchy nut butters.  Beverages  Citrus juices (such as orange juice or lemonade). Any soda or carbonated beverage with bubbles.  Sweets and Desserts  Cookies. Any dessert that contains nuts, seeds, or coconut.  Other  Fried foods. Grilled cheese sandwiches.  The items listed above may not be a complete list of foods and beverages that are not recommended. Ask your dietitian for more information.     This information is not intended to replace advice given to you by your health care provider. Make sure you discuss any questions you have with your health care provider.     Document Released: 12/18/2006 Document Revised: 01/08/2016  Document Reviewed: 05/19/2015  Elsevier Interactive Patient Education ©2016 [employer]  Document Released: 10/18/2005 Document Revised: 05/03/2016 Document Reviewed: 07/15/2014  Elsevier Interactive Patient Education ©2016 [employer]

## 2018-05-06 NOTE — PROCEDURES
DATE OF SERVICE:  05/06/2018    PROCEDURE:  Flexible laryngoscopy    INDCATION:  Previous airway edema.  This is a 22-year-old who I removed his   tonsils yesterday in a quinsy tonsillectomy.  Prior to this, he had some   airway edema.  This is to check to make sure it has resolved.    DESCRIPTION OF PROCEDURE:  Flexible laryngoscope was introduced into the left   nostril, his right with septal deviation.  There was minimal adenoid tissue,   which is improved.  The tonsil has been removed, so this was not swollen on   the right hand side anymore.  Base of tongue and larynx appeared normal.    There is no edema.  Vocal cord motion was normal.  Patient tolerated the   procedure well.  No complications.       ____________________________________     MD LIANG Salcido / ISSA    DD:  05/06/2018 11:19:26  DT:  05/06/2018 11:39:52    D#:  [id number]  Job#:  [id number]

## 2018-05-06 NOTE — PROGRESS NOTES
Received bedside report and assumed care at 1900.    Pt is A&O x4.  Pain 5/10, declines intervention at this time.  Denies nausea  Tolerating clear liquid diet  + Urine output  LBM PTA.  + Flatus  Up with standby assist  SCD's on.  Bed in lowest position and locked. HOB at 30 degrees  Pt resting comfortably now.  Review plan of care with patient  Call light within reach  Hourly rounds in place  All needs met at this time

## 2018-05-06 NOTE — PROGRESS NOTES
ENT Progress Note    Date of Service: 2018    Chief Complaint  22 y.o. male admitted 2018 with right tonsil abscess  Pod 1 s/p faiza tonsillectomy  Doing well  Pain is better -2/10  No cp/ back pain   No SOB  Tolerating softs, no bleeding         ROS   Physical Exam  Laboratory/Imaging   Hemodynamics  Temp (24hrs), Av.3 °C (97.3 °F), Min:35.9 °C (96.6 °F), Max:36.7 °C (98 °F)   Temperature: 36.2 °C (97.1 °F)  Pulse  Av.8  Min: 51  Max: 87 Heart Rate (Monitored): 63  Blood Pressure: 102/53, NIBP: 101/55      Respiratory      Respiration: 17, Pulse Oximetry: 96 %       Nutrition  Orders Placed This Encounter   Procedures   • DIET ORDER     Standing Status:   Standing     Number of Occurrences:   1     Order Specific Question:   Diet:     Answer:   Low Fiber(GI Soft) [2]     Physical Exam   NAD  Full neck rom   Neck is flat   No trismus  Healing tonsil fossa - no blood      Recent Labs      18   0811  18   1038   WBC  16.2*  12.1*   RBC  4.64*  4.41*   HEMOGLOBIN  14.4  13.5*   HEMATOCRIT  41.8*  39.9*   MCV  90.1  90.5   MCH  31.0  30.6   MCHC  34.4  33.8   RDW  39.5  38.7   PLATELETCT  383  355   MPV  8.8*  8.7*     Recent Labs      18   0811   SODIUM  141   POTASSIUM  3.8   CHLORIDE  105   CO2  22   GLUCOSE  90   BUN  11   CREATININE  0.67   CALCIUM  9.5     Recent Labs      18   0811   APTT  34.0   INR  1.11                  Assessment/Plan     POD 1 s/p faiza tonsillectomy  Doing well, but no pus in OR will check ct neck to ensure resolution   If ok then home with augmentin x 10 days  Follow up 2 weeks    Quality-Core Measures

## 2018-05-06 NOTE — PROGRESS NOTES
Patient discharged .    IV removed prior to discharge.   Signed prescriptions given to patient.  Discharge education provided, all questions answered.   Verbalized understanding of discharge education.   Wheeled out with all personal belongings collected from room.

## 2018-05-06 NOTE — DISCHARGE SUMMARY
DATE OF SERVICE:  05/06/2018    DATE OF ADMISSION:  05/05/2018    REASON FOR ADMISSION:  Right tonsil abscess.    DISCHARGE DIAGNOSIS:  Tonsillitis.    PROCEDURES PERFORMED:  Bilateral quinsy tonsillectomy on 05/05/2018 by Dr. Sparks.    COMPLICATIONS:  None.    HOSPITAL COURSE:  Patient was admitted.  He has had 10 days of right-sided   sore throat and he actually notes that he has had 4 years of tonsil symptoms   with a foreign body sensation on the right.  His CT scan revealed a 3 cm   hypodensity in the right tonsil.  He had trismus.  He had a bulging soft   palate.  I attempted to drain this in the emergency room, but it was unable to   be drained.  He tolerated it well.  At that point, I took him to the   operating room.  I tried to drain it as well.  I was unable to drain, so I   performed a quinsy tonsillectomy.  There was a large empty pocket posterior to   his tonsil that was likely consistent with the abscess cavity; however, there   was no pus in it.  He was observed overnight.  He did have some airway edema   noted on flexible laryngoscopy.  This is resolved on postoperative day 1.  He   is feeling much better.  His pain is significantly improved.  He is tolerating   a soft diet.  His white count has decreased.  He is on Unasyn and Decadron.    CT neck repeated on 5/6 shows resolution of abscess with expected post op changes.He   will have a soft diet for 2 weeks.  He is discharged home on his regular home   medications as well as Augmentin and oxycodone.  I consented him for   oxycodone.  I discussed the risks of narcotics.  I performed a risk   assessment, his risk score is 1.  He understands to take Tylenol or Motrin and   if that does not work only then take the oxycodone.  He will have a soft diet   for 2 weeks.  I will see him back next week.  Pathology is pending at this   time.       ____________________________________     MD LIANG Salcido / ISSA    DD:  05/06/2018 11:27:30  DT:   05/06/2018 12:37:31    D#:  [id number]  Job#:  [id number]

## 2018-05-06 NOTE — PROGRESS NOTES
Report received from RN, assumed care at 0700  Pt is A0X4, and responds appropriately   Pt declines any SOB, chest pain, new onset of numbness/ tingiling  Pt rates pain at 4 /10, on a scale of 1-10, pt medicated per MAR  Pt is voiding adequatly and without hesitancy  Pt has + flatus, + bowel sounds,  BM PTA  Pt ambulates with a stand by assist   Pt is tolerating a clear liquid diet with no reds diet, pt denies any nausea/vomiting  Plan of care discussed, all questions answered. Explained importance of calling before getting OOB and pt verbalizes understanding. Explained importance of oral care. Call light is within reach, treaded slipper socks on, bed in lowest/ locked position, hourly rounding in place, all needs met at this time

## 2018-05-06 NOTE — CARE PLAN
Problem: Venous Thromboembolism (VTW)/Deep Vein Thrombosis (DVT) Prevention:  Goal: Patient will participate in Venous Thrombosis (VTE)/Deep Vein Thrombosis (DVT)Prevention Measures  Outcome: PROGRESSING AS EXPECTED  Wearing SCDs and ambulates occasionally.    Problem: Pain Management  Goal: Pain level will decrease to patient's comfort goal  Outcome: PROGRESSING AS EXPECTED  Pain being managed with oral pain medication. States pain is tolerable.

## 2018-05-07 LAB
S PYO SPEC QL CULT: NORMAL
SIGNIFICANT IND 70042: NORMAL
SITE SITE: NORMAL
SOURCE SOURCE: NORMAL

## 2019-01-22 ENCOUNTER — OFFICE VISIT (OUTPATIENT)
Dept: URGENT CARE | Facility: CLINIC | Age: 24
End: 2019-01-22
Payer: COMMERCIAL

## 2019-01-22 ENCOUNTER — APPOINTMENT (OUTPATIENT)
Dept: RADIOLOGY | Facility: IMAGING CENTER | Age: 24
End: 2019-01-22
Attending: FAMILY MEDICINE
Payer: COMMERCIAL

## 2019-01-22 VITALS
OXYGEN SATURATION: 98 % | TEMPERATURE: 98.9 F | HEART RATE: 73 BPM | WEIGHT: 155 LBS | HEIGHT: 66 IN | DIASTOLIC BLOOD PRESSURE: 80 MMHG | RESPIRATION RATE: 16 BRPM | SYSTOLIC BLOOD PRESSURE: 126 MMHG | BODY MASS INDEX: 24.91 KG/M2

## 2019-01-22 DIAGNOSIS — S63.617A SPRAIN OF LEFT LITTLE FINGER, UNSPECIFIED SITE OF FINGER, INITIAL ENCOUNTER: ICD-10-CM

## 2019-01-22 PROCEDURE — 73140 X-RAY EXAM OF FINGER(S): CPT | Mod: 26,LT | Performed by: FAMILY MEDICINE

## 2019-01-22 PROCEDURE — 99214 OFFICE O/P EST MOD 30 MIN: CPT | Performed by: FAMILY MEDICINE

## 2019-01-22 RX ORDER — MELOXICAM 15 MG/1
15 TABLET ORAL DAILY
Qty: 7 TAB | Refills: 1 | Status: SHIPPED | OUTPATIENT
Start: 2019-01-22 | End: 2019-01-29

## 2019-01-22 ASSESSMENT — ENCOUNTER SYMPTOMS
SENSORY CHANGE: 0
DIZZINESS: 0
CHILLS: 0
FEVER: 0
FOCAL WEAKNESS: 0

## 2019-01-22 NOTE — PROGRESS NOTES
"Subjective:      Keith Elaine is a 23 y.o. male who presents with Finger Injury (x3 days ago, left pinky finger, jammed finger in the snow)      - This is a very pleasant, well and non-toxic appearing 23 y.o. male with complaints of trip/fall and jammed Lt pinky finger about 3 days ago            ALLERGIES:  Patient has no known allergies.     PMH:  History reviewed. No pertinent past medical history.     PSH:  Past Surgical History:   Procedure Laterality Date   • TONSILLECTOMY Bilateral 5/5/2018    Procedure: TONSILLECTOMY;  Surgeon: Miguel Angel Sparks M.D.;  Location: SURGERY [facility];  Service: Ent       MEDS:    Current Outpatient Prescriptions:   •  meloxicam (MOBIC) 15 MG tablet, Take 1 Tab by mouth every day for 7 days., Disp: 7 Tab, Rfl: 1    ** I have documented what I find to be significant in regards to past medical, social, family and surgical history  in my HPI or under PMH/PSH/FH review section, otherwise it is contributory **           HPI    Review of Systems   Constitutional: Negative for chills and fever.   Musculoskeletal: Positive for joint pain.   Neurological: Negative for dizziness, sensory change and focal weakness.          Objective:     /80   Pulse 73   Temp 37.2 °C (98.9 °F)   Resp 16   Ht 1.676 m (5' 6\")   Wt 70.3 kg (155 lb)   SpO2 98%   BMI 25.02 kg/m²      Physical Exam   Constitutional: He appears well-developed. No distress.   HENT:   Head: Normocephalic and atraumatic.   Neck: Neck supple.   Cardiovascular: Regular rhythm.    No murmur heard.  Pulmonary/Chest: Effort normal. No respiratory distress.   Neurological: He is alert. He exhibits normal muscle tone.   Skin: Skin is warm and dry.   Psychiatric: He has a normal mood and affect. Judgment normal.   Nursing note and vitals reviewed.  Lt pinky: limited ROM due to pain. + TTP, + edema and bruising.             Assessment/Plan:         1. Sprain of left little finger, unspecified site of finger, initial " encounter  DX-FINGER(S) 2+ LEFT    REFERRAL TO SPORTS MEDICINE    meloxicam (MOBIC) 15 MG tablet       XR + Fx    - splinted, f/u sports med  - RICE/Motrin       Dx & d/c instructions discussed w/ patient and/or family members.     ER precautions (worsening signs symptoms and when to go to ER) discussed.    Follow up w/ PCP in 2-3 days to make sure symptoms improving and no further intervention/treatment and/or work-up needed was advised, ER if feeling worse or not improving in 2 days.    Possible side effects (i.e. Rash, GI upset/constipation, sedation, elevation of BP or sugars) of any medications given discussed.     Patient left in stable condition

## 2019-02-06 ENCOUNTER — OFFICE VISIT (OUTPATIENT)
Dept: MEDICAL GROUP | Facility: CLINIC | Age: 24
End: 2019-02-06
Payer: COMMERCIAL

## 2019-02-06 VITALS
OXYGEN SATURATION: 97 % | SYSTOLIC BLOOD PRESSURE: 100 MMHG | BODY MASS INDEX: 29.35 KG/M2 | HEIGHT: 66 IN | TEMPERATURE: 99.3 F | DIASTOLIC BLOOD PRESSURE: 60 MMHG | HEART RATE: 74 BPM | RESPIRATION RATE: 14 BRPM | WEIGHT: 182.6 LBS

## 2019-02-06 DIAGNOSIS — S62.617A CLOSED DISPLACED FRACTURE OF PROXIMAL PHALANX OF LEFT LITTLE FINGER, INITIAL ENCOUNTER: ICD-10-CM

## 2019-02-06 PROCEDURE — 99203 OFFICE O/P NEW LOW 30 MIN: CPT | Performed by: FAMILY MEDICINE

## 2019-02-06 ASSESSMENT — ENCOUNTER SYMPTOMS
SENSORY CHANGE: 0
SHORTNESS OF BREATH: 0
FOCAL WEAKNESS: 0
SORE THROAT: 0
FEVER: 0

## 2019-02-06 NOTE — PROGRESS NOTES
"Subjective:     Keith Elaine is a 23 y.o. male who presents for Finger Injury (Left hand, 5th digit.)    HPI  Pt presents for evaluation of left fifth digit injury  Patient is a 23-year-old male with left fifth proximal phalanx fracture sustained 2.5 weeks ago.    Patient initially seen in urgent care 3 days after the injury.    He was given a finger splint and referred to sports medicine.    Patient has not been wearing his finger splint and has been using his finger as normally as he can.    He works a very physical job  Fifth digit appears crooked to him and cannot abduct finger  Swelling has improved and has minimal pain when not moving it  Pain greatly increases when touching the area or moving his finger  Pain mainly at the MCP joint radiates into the distal finger  Or tingling in the finger    Review of Systems   Constitutional: Negative for fever.   HENT: Negative for sore throat.    Respiratory: Negative for shortness of breath.    Cardiovascular: Negative for chest pain.   Skin: Negative for rash.   Neurological: Negative for sensory change and focal weakness.     PMH: History of multiple fractures in the past  MEDS: No daily meds  ALLERGIES: No Known Allergies  SURGHX:   Past Surgical History:   Procedure Laterality Date   • TONSILLECTOMY Bilateral 5/5/2018    Procedure: TONSILLECTOMY;  Surgeon: Miguel Angel Sparks M.D.;  Location: SURGERY [facility];  Service: Ent     SOCHX:  reports that he has quit smoking. He has never used smokeless tobacco. He reports that he drinks alcohol. He reports that he does not use drugs.  FH: Family history was reviewed, not contributing to acute injury      Objective:   /60 (BP Location: Left arm, Patient Position: Sitting, BP Cuff Size: Adult)   Pulse 74   Temp 37.4 °C (99.3 °F) (Temporal)   Resp 14   Ht 1.676 m (5' 6\")   Wt 82.8 kg (182 lb 9.6 oz)   SpO2 97%   BMI 29.47 kg/m²     Physical Exam   Constitutional: He is oriented to person, place, and time. " He appears well-developed and well-nourished. No distress.   HENT:   Head: Normocephalic and atraumatic.   Musculoskeletal:   Left hand  General: 5th digit abducted at rest  Palpation: TTP along 5th MCP joint  ROM: FROM throughout  Strength: Weak adduction of 5th digit, otherwise 5/5 throughout   Neuro: median, radial, ulnar nerves intact on testing  Vascular: radial, ulnar pulses 2+ and symmetric, cap refill <2 sec   Neurological: He is alert and oriented to person, place, and time.   Skin: Skin is warm and dry. No rash noted. He is not diaphoretic.   Psychiatric: He has a normal mood and affect. His behavior is normal. Judgment and thought content normal.     Left finger x-ray -reviewed patient's x-ray images with him in office today.  Has proximal phalanx fracture with mild ulnar and dorsal angulation.    Assessment/Plan:   Assessment    1. Closed displaced fracture of proximal phalanx of left little finger, initial encounter  - REFERRAL TO ORTHOPEDICS    Patient is a 23-year-old male with left fifth proximal phalanx fracture sustained 2.5 weeks ago.  Patient has not been wearing initial splint given at urgent care and has been trying to use his finger normally and let it heal on its own.  His finger is abducted at rest and fracture appears angulated.  Because the fracture pattern, time since injury, and patient compliance, would be a poor candidate for nonoperative management.  He has been referred to orthopedic surgery for consideration of surgical fixation.  He was supplied with a note for work.  He was placed in an ulnar gutter cast until he can see ortho.  Follow-up with orthopedic surgery.

## 2019-02-06 NOTE — LETTER
February 6, 2019    To Whom It May Concern:         This is confirmation that Keith Elaine attended his scheduled appointment with Stefan Solis M.D. on 2/06/19.  He has a broken pinky finger.  He has been placed in a cast and will likely require surgery for this finger.  It is anticipated that this injury will take approximately 6 weeks to fully recover from.  At this point, patient should not be using his left hand to do any lifting or carrying.  He may use his left hand while in the cast for very light duties such as writing or typing.  May use his right arm with no restrictions.         If you have any questions please do not hesitate to call me at the phone number listed below.    Sincerely,          Stefan Solis M.D.  [phone]

## 2019-02-13 ENCOUNTER — APPOINTMENT (OUTPATIENT)
Dept: RADIOLOGY | Facility: IMAGING CENTER | Age: 24
End: 2019-02-13
Attending: FAMILY MEDICINE
Payer: COMMERCIAL

## 2019-02-13 ENCOUNTER — OFFICE VISIT (OUTPATIENT)
Dept: MEDICAL GROUP | Facility: CLINIC | Age: 24
End: 2019-02-13
Payer: COMMERCIAL

## 2019-02-13 VITALS
WEIGHT: 182 LBS | SYSTOLIC BLOOD PRESSURE: 104 MMHG | TEMPERATURE: 98.3 F | BODY MASS INDEX: 29.25 KG/M2 | RESPIRATION RATE: 14 BRPM | OXYGEN SATURATION: 97 % | HEIGHT: 66 IN | DIASTOLIC BLOOD PRESSURE: 60 MMHG | HEART RATE: 88 BPM

## 2019-02-13 DIAGNOSIS — S62.617A CLOSED DISPLACED FRACTURE OF PROXIMAL PHALANX OF LEFT LITTLE FINGER, INITIAL ENCOUNTER: ICD-10-CM

## 2019-02-13 PROCEDURE — 99213 OFFICE O/P EST LOW 20 MIN: CPT | Performed by: FAMILY MEDICINE

## 2019-02-13 PROCEDURE — 73140 X-RAY EXAM OF FINGER(S): CPT | Mod: TC,LT | Performed by: FAMILY MEDICINE

## 2019-02-13 ASSESSMENT — ENCOUNTER SYMPTOMS
SHORTNESS OF BREATH: 0
SORE THROAT: 0
FEVER: 0
FOCAL WEAKNESS: 0
SENSORY CHANGE: 0

## 2019-02-13 NOTE — PROGRESS NOTES
"Subjective:     Keith Elaine is a 23 y.o. male who presents for Finger Injury (1 week recheck for cast and left hand, 5th digit injury.)      HPI  Pt presents for follow-up of left fifth finger fracture  Patient was seen in office 1 week ago for displaced fracture of proximal phalanx of left fifth digit  Patient placed into an ulnar gutter cast and referred to orthopedics  Patient unfortunately did not make an appointment with orthopedics yet  Patient showered with cast on which ruined the cast.  He removed cast on his own and has not been wearing anything  Has been using his hand and finger fairly normally  Patient overall feels his finger pain has greatly improved since last visit  Finger still appears crooked  He feels he nearly has full range of motion  Denies numbness or tingling in the finger    Review of Systems   Constitutional: Negative for fever.   HENT: Negative for sore throat.    Respiratory: Negative for shortness of breath.    Cardiovascular: Negative for chest pain.   Skin: Negative for rash.   Neurological: Negative for sensory change and focal weakness.     PMH: No chronic medical problems  MEDS: No daily meds  ALLERGIES: No Known Allergies  SURGHX:   Past Surgical History:   Procedure Laterality Date   • TONSILLECTOMY Bilateral 5/5/2018    Procedure: TONSILLECTOMY;  Surgeon: Miguel Angel Sparks M.D.;  Location: SURGERY [facility];  Service: Ent     SOCHX:  reports that he has quit smoking. He has never used smokeless tobacco. He reports that he drinks alcohol. He reports that he does not use drugs.     Objective:   /60 (BP Location: Left arm, Patient Position: Sitting, BP Cuff Size: Adult)   Pulse 88   Temp 36.8 °C (98.3 °F) (Temporal)   Resp 14   Ht 1.676 m (5' 6\")   Wt 82.6 kg (182 lb)   SpO2 97%   BMI 29.38 kg/m²     Physical Exam   Constitutional: He is oriented to person, place, and time. He appears well-developed and well-nourished. No distress.   HENT:   Head: " Normocephalic and atraumatic.   Musculoskeletal:   Left wrist/hand  General: 5th finger with mild ulnar angulation  Palpation: +Mild TTP along 5th proximal phalanx and 5th MCP joint   ROM: 5th digit with full extension, flexion limited by pain   Neuro: median, radial, ulnar nerves intact on testing  Vascular: radial, ulnar pulses 2+ and symmetric, cap refill <2 sec   Neurological: He is alert and oriented to person, place, and time.   Skin: Skin is warm and dry. No rash noted. He is not diaphoretic.   Psychiatric: He has a normal mood and affect. His behavior is normal. Judgment and thought content normal.     Assessment/Plan:   Assessment    1. Closed displaced fracture of proximal phalanx of left little finger, initial encounter  Patient is a 23-year-old male with left fifth proximal phalanx fracture.  Initial injury 3.5 weeks ago.  Unfortunately, patient has not made appointment with orthopedics yet.  Patient self removed cast at home and his not been wearing anything.  Repeat x-ray today redemonstrates spiral fracture of proximal phalanx of fifth digit without much evidence of healing.  Though his pain has greatly improved and range of motion is getting better, advised that his fracture is not healing and emphasized the importance of seeing orthopedics.  Patient given phone number to call orthopedics to get an appointment and will try to get seen in the next few days.  Advised to call back to this clinic if having any troubles getting into Ortho.  - DX-FINGER(S) 2+ LEFT; Future

## 2019-11-03 ENCOUNTER — HOSPITAL ENCOUNTER (INPATIENT)
Facility: MEDICAL CENTER | Age: 24
LOS: 5 days | DRG: 682 | End: 2019-11-08
Attending: EMERGENCY MEDICINE | Admitting: INTERNAL MEDICINE
Payer: COMMERCIAL

## 2019-11-03 ENCOUNTER — APPOINTMENT (OUTPATIENT)
Dept: RADIOLOGY | Facility: MEDICAL CENTER | Age: 24
DRG: 682 | End: 2019-11-03
Payer: COMMERCIAL

## 2019-11-03 ENCOUNTER — APPOINTMENT (OUTPATIENT)
Dept: RADIOLOGY | Facility: MEDICAL CENTER | Age: 24
DRG: 682 | End: 2019-11-03
Attending: EMERGENCY MEDICINE
Payer: COMMERCIAL

## 2019-11-03 DIAGNOSIS — J96.01 ACUTE RESPIRATORY FAILURE WITH HYPOXIA (HCC): ICD-10-CM

## 2019-11-03 DIAGNOSIS — T07.XXXA: ICD-10-CM

## 2019-11-03 DIAGNOSIS — E87.29 INCREASED ANION GAP METABOLIC ACIDOSIS: ICD-10-CM
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
## 2019-11-03 DIAGNOSIS — R10.10 UPPER ABDOMINAL PAIN: ICD-10-CM

## 2019-11-03 DIAGNOSIS — R11.2 NON-INTRACTABLE VOMITING WITH NAUSEA, UNSPECIFIED VOMITING TYPE: ICD-10-CM

## 2019-11-03 DIAGNOSIS — E87.29 HIGH ANION GAP METABOLIC ACIDOSIS: ICD-10-CM

## 2019-11-03 DIAGNOSIS — N17.9 AKI (ACUTE KIDNEY INJURY) (HCC): ICD-10-CM
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
## 2019-11-03 PROBLEM — E66.09 CLASS 1 OBESITY DUE TO EXCESS CALORIES WITHOUT SERIOUS COMORBIDITY WITH BODY MASS INDEX (BMI) OF 30.0 TO 30.9 IN ADULT: Status: ACTIVE | Noted: 2019-11-03

## 2019-11-03 LAB
ALBUMIN SERPL BCP-MCNC: 4.7 G/DL (ref 3.2–4.9)
ALBUMIN/GLOB SERPL: 1.4 G/DL
ALP SERPL-CCNC: 63 U/L (ref 30–99)
ALT SERPL-CCNC: 66 U/L (ref 2–50)
ANION GAP SERPL CALC-SCNC: 22 MMOL/L (ref 0–11.9)
AST SERPL-CCNC: 42 U/L (ref 12–45)
BASOPHILS # BLD AUTO: 0.3 % (ref 0–1.8)
BASOPHILS # BLD: 0.04 K/UL (ref 0–0.12)
BILIRUB SERPL-MCNC: 0.5 MG/DL (ref 0.1–1.5)
BUN SERPL-MCNC: 70 MG/DL (ref 8–22)
CALCIUM SERPL-MCNC: 8.3 MG/DL (ref 8.5–10.5)
CHLORIDE SERPL-SCNC: 97 MMOL/L (ref 96–112)
CK SERPL-CCNC: 1217 U/L (ref 0–154)
CO2 SERPL-SCNC: 16 MMOL/L (ref 20–33)
CREAT SERPL-MCNC: 11.88 MG/DL (ref 0.5–1.4)
EOSINOPHIL # BLD AUTO: 0.01 K/UL (ref 0–0.51)
EOSINOPHIL NFR BLD: 0.1 % (ref 0–6.9)
ERYTHROCYTE [DISTWIDTH] IN BLOOD BY AUTOMATED COUNT: 38.6 FL (ref 35.9–50)
FLUAV RNA SPEC QL NAA+PROBE: NEGATIVE
FLUBV RNA SPEC QL NAA+PROBE: NEGATIVE
GLOBULIN SER CALC-MCNC: 3.3 G/DL (ref 1.9–3.5)
GLUCOSE SERPL-MCNC: 98 MG/DL (ref 65–99)
HCT VFR BLD AUTO: 40.6 % (ref 42–52)
HGB BLD-MCNC: 14.3 G/DL (ref 14–18)
IMM GRANULOCYTES # BLD AUTO: 0.04 K/UL (ref 0–0.11)
IMM GRANULOCYTES NFR BLD AUTO: 0.3 % (ref 0–0.9)
LACTATE BLD-SCNC: 1.4 MMOL/L (ref 0.5–2)
LIPASE SERPL-CCNC: 18 U/L (ref 11–82)
LYMPHOCYTES # BLD AUTO: 1.75 K/UL (ref 1–4.8)
LYMPHOCYTES NFR BLD: 14.6 % (ref 22–41)
MCH RBC QN AUTO: 31.1 PG (ref 27–33)
MCHC RBC AUTO-ENTMCNC: 35.2 G/DL (ref 33.7–35.3)
MCV RBC AUTO: 88.3 FL (ref 81.4–97.8)
MONOCYTES # BLD AUTO: 1.07 K/UL (ref 0–0.85)
MONOCYTES NFR BLD AUTO: 8.9 % (ref 0–13.4)
NEUTROPHILS # BLD AUTO: 9.09 K/UL (ref 1.82–7.42)
NEUTROPHILS NFR BLD: 75.8 % (ref 44–72)
NRBC # BLD AUTO: 0 K/UL
NRBC BLD-RTO: 0 /100 WBC
PLATELET # BLD AUTO: 268 K/UL (ref 164–446)
PMV BLD AUTO: 9.4 FL (ref 9–12.9)
POTASSIUM SERPL-SCNC: 4.3 MMOL/L (ref 3.6–5.5)
PROT SERPL-MCNC: 8 G/DL (ref 6–8.2)
RBC # BLD AUTO: 4.6 M/UL (ref 4.7–6.1)
SODIUM SERPL-SCNC: 135 MMOL/L (ref 135–145)
WBC # BLD AUTO: 12 K/UL (ref 4.8–10.8)

## 2019-11-03 PROCEDURE — 80053 COMPREHEN METABOLIC PANEL: CPT

## 2019-11-03 PROCEDURE — 700105 HCHG RX REV CODE 258: Performed by: EMERGENCY MEDICINE

## 2019-11-03 PROCEDURE — 85025 COMPLETE CBC W/AUTO DIFF WBC: CPT

## 2019-11-03 PROCEDURE — 83690 ASSAY OF LIPASE: CPT

## 2019-11-03 PROCEDURE — 76705 ECHO EXAM OF ABDOMEN: CPT

## 2019-11-03 PROCEDURE — 770006 HCHG ROOM/CARE - MED/SURG/GYN SEMI*

## 2019-11-03 PROCEDURE — 700102 HCHG RX REV CODE 250 W/ 637 OVERRIDE(OP): Performed by: EMERGENCY MEDICINE

## 2019-11-03 PROCEDURE — 36415 COLL VENOUS BLD VENIPUNCTURE: CPT

## 2019-11-03 PROCEDURE — 700111 HCHG RX REV CODE 636 W/ 250 OVERRIDE (IP): Performed by: HOSPITALIST

## 2019-11-03 PROCEDURE — 87502 INFLUENZA DNA AMP PROBE: CPT

## 2019-11-03 PROCEDURE — 99223 1ST HOSP IP/OBS HIGH 75: CPT | Performed by: HOSPITALIST

## 2019-11-03 PROCEDURE — A9270 NON-COVERED ITEM OR SERVICE: HCPCS | Performed by: EMERGENCY MEDICINE

## 2019-11-03 PROCEDURE — 90471 IMMUNIZATION ADMIN: CPT

## 2019-11-03 PROCEDURE — 87040 BLOOD CULTURE FOR BACTERIA: CPT | Mod: 91

## 2019-11-03 PROCEDURE — 3E0234Z INTRODUCTION OF SERUM, TOXOID AND VACCINE INTO MUSCLE, PERCUTANEOUS APPROACH: ICD-10-PCS | Performed by: INTERNAL MEDICINE

## 2019-11-03 PROCEDURE — 96375 TX/PRO/DX INJ NEW DRUG ADDON: CPT

## 2019-11-03 PROCEDURE — 82550 ASSAY OF CK (CPK): CPT

## 2019-11-03 PROCEDURE — 90715 TDAP VACCINE 7 YRS/> IM: CPT | Performed by: EMERGENCY MEDICINE

## 2019-11-03 PROCEDURE — 82306 VITAMIN D 25 HYDROXY: CPT

## 2019-11-03 PROCEDURE — 96374 THER/PROPH/DIAG INJ IV PUSH: CPT

## 2019-11-03 PROCEDURE — 83605 ASSAY OF LACTIC ACID: CPT

## 2019-11-03 PROCEDURE — 99285 EMERGENCY DEPT VISIT HI MDM: CPT

## 2019-11-03 PROCEDURE — 82330 ASSAY OF CALCIUM: CPT

## 2019-11-03 PROCEDURE — 71046 X-RAY EXAM CHEST 2 VIEWS: CPT

## 2019-11-03 PROCEDURE — 700111 HCHG RX REV CODE 636 W/ 250 OVERRIDE (IP): Performed by: EMERGENCY MEDICINE

## 2019-11-03 PROCEDURE — 83970 ASSAY OF PARATHORMONE: CPT

## 2019-11-03 RX ORDER — ONDANSETRON 4 MG/1
4 TABLET, ORALLY DISINTEGRATING ORAL EVERY 4 HOURS PRN
Status: DISCONTINUED | OUTPATIENT
Start: 2019-11-03 | End: 2019-11-08 | Stop reason: HOSPADM

## 2019-11-03 RX ORDER — HEPARIN SODIUM 5000 [USP'U]/ML
5000 INJECTION, SOLUTION INTRAVENOUS; SUBCUTANEOUS EVERY 8 HOURS
Status: DISCONTINUED | OUTPATIENT
Start: 2019-11-04 | End: 2019-11-08 | Stop reason: HOSPADM

## 2019-11-03 RX ORDER — AMOXICILLIN 250 MG
2 CAPSULE ORAL 2 TIMES DAILY
Status: DISCONTINUED | OUTPATIENT
Start: 2019-11-04 | End: 2019-11-08 | Stop reason: HOSPADM

## 2019-11-03 RX ORDER — MORPHINE SULFATE 4 MG/ML
2 INJECTION, SOLUTION INTRAMUSCULAR; INTRAVENOUS
Status: DISCONTINUED | OUTPATIENT
Start: 2019-11-03 | End: 2019-11-08 | Stop reason: HOSPADM

## 2019-11-03 RX ORDER — OXYCODONE HYDROCHLORIDE 5 MG/1
2.5 TABLET ORAL
Status: DISCONTINUED | OUTPATIENT
Start: 2019-11-03 | End: 2019-11-08 | Stop reason: HOSPADM

## 2019-11-03 RX ORDER — ONDANSETRON 2 MG/ML
4 INJECTION INTRAMUSCULAR; INTRAVENOUS ONCE
Status: COMPLETED | OUTPATIENT
Start: 2019-11-03 | End: 2019-11-03

## 2019-11-03 RX ORDER — CLONIDINE HYDROCHLORIDE 0.1 MG/1
0.1 TABLET ORAL EVERY 6 HOURS PRN
Status: DISCONTINUED | OUTPATIENT
Start: 2019-11-03 | End: 2019-11-08 | Stop reason: HOSPADM

## 2019-11-03 RX ORDER — OXYCODONE HYDROCHLORIDE 5 MG/1
5 TABLET ORAL
Status: DISCONTINUED | OUTPATIENT
Start: 2019-11-03 | End: 2019-11-08 | Stop reason: HOSPADM

## 2019-11-03 RX ORDER — ONDANSETRON 2 MG/ML
4 INJECTION INTRAMUSCULAR; INTRAVENOUS EVERY 4 HOURS PRN
Status: DISCONTINUED | OUTPATIENT
Start: 2019-11-03 | End: 2019-11-08 | Stop reason: HOSPADM

## 2019-11-03 RX ORDER — POLYETHYLENE GLYCOL 3350 17 G/17G
1 POWDER, FOR SOLUTION ORAL
Status: DISCONTINUED | OUTPATIENT
Start: 2019-11-03 | End: 2019-11-08 | Stop reason: HOSPADM

## 2019-11-03 RX ORDER — PROMETHAZINE HYDROCHLORIDE 12.5 MG/1
12.5-25 SUPPOSITORY RECTAL EVERY 4 HOURS PRN
Status: DISCONTINUED | OUTPATIENT
Start: 2019-11-03 | End: 2019-11-08 | Stop reason: HOSPADM

## 2019-11-03 RX ORDER — SODIUM CHLORIDE, SODIUM LACTATE, POTASSIUM CHLORIDE, CALCIUM CHLORIDE 600; 310; 30; 20 MG/100ML; MG/100ML; MG/100ML; MG/100ML
1000 INJECTION, SOLUTION INTRAVENOUS ONCE
Status: COMPLETED | OUTPATIENT
Start: 2019-11-03 | End: 2019-11-03

## 2019-11-03 RX ORDER — BISACODYL 10 MG
10 SUPPOSITORY, RECTAL RECTAL
Status: DISCONTINUED | OUTPATIENT
Start: 2019-11-03 | End: 2019-11-08 | Stop reason: HOSPADM

## 2019-11-03 RX ORDER — ACETAMINOPHEN 325 MG/1
650 TABLET ORAL EVERY 6 HOURS PRN
Status: DISCONTINUED | OUTPATIENT
Start: 2019-11-03 | End: 2019-11-08 | Stop reason: HOSPADM

## 2019-11-03 RX ORDER — PROMETHAZINE HYDROCHLORIDE 25 MG/1
12.5-25 TABLET ORAL EVERY 4 HOURS PRN
Status: DISCONTINUED | OUTPATIENT
Start: 2019-11-03 | End: 2019-11-08 | Stop reason: HOSPADM

## 2019-11-03 RX ORDER — PROCHLORPERAZINE EDISYLATE 5 MG/ML
5-10 INJECTION INTRAMUSCULAR; INTRAVENOUS EVERY 4 HOURS PRN
Status: DISCONTINUED | OUTPATIENT
Start: 2019-11-03 | End: 2019-11-08 | Stop reason: HOSPADM

## 2019-11-03 RX ORDER — AMOXICILLIN AND CLAVULANATE POTASSIUM 500; 125 MG/1; MG/1
1 TABLET, FILM COATED ORAL ONCE
Status: COMPLETED | OUTPATIENT
Start: 2019-11-03 | End: 2019-11-03

## 2019-11-03 RX ADMIN — CLOSTRIDIUM TETANI TOXOID ANTIGEN (FORMALDEHYDE INACTIVATED), CORYNEBACTERIUM DIPHTHERIAE TOXOID ANTIGEN (FORMALDEHYDE INACTIVATED), BORDETELLA PERTUSSIS TOXOID ANTIGEN (GLUTARALDEHYDE INACTIVATED), BORDETELLA PERTUSSIS FILAMENTOUS HEMAGGLUTININ ANTIGEN (FORMALDEHYDE INACTIVATED), BORDETELLA PERTUSSIS PERTACTIN ANTIGEN, AND BORDETELLA PERTUSSIS FIMBRIAE 2/3 ANTIGEN 0.5 ML: 5; 2; 2.5; 5; 3; 5 INJECTION, SUSPENSION INTRAMUSCULAR at 20:39

## 2019-11-03 RX ADMIN — MORPHINE SULFATE 2 MG: 4 INJECTION INTRAVENOUS at 23:00

## 2019-11-03 RX ADMIN — PROCHLORPERAZINE EDISYLATE 10 MG: 5 INJECTION INTRAMUSCULAR; INTRAVENOUS at 23:54

## 2019-11-03 RX ADMIN — ONDANSETRON 4 MG: 2 INJECTION INTRAMUSCULAR; INTRAVENOUS at 20:39

## 2019-11-03 RX ADMIN — AMOXICILLIN AND CLAVULANATE POTASSIUM 1 TABLET: 500; 125 TABLET, FILM COATED ORAL at 22:07

## 2019-11-03 RX ADMIN — SODIUM CHLORIDE, POTASSIUM CHLORIDE, SODIUM LACTATE AND CALCIUM CHLORIDE 1000 ML: 600; 310; 30; 20 INJECTION, SOLUTION INTRAVENOUS at 20:39

## 2019-11-03 ASSESSMENT — ENCOUNTER SYMPTOMS
PSYCHIATRIC NEGATIVE: 1
NAUSEA: 1
MUSCULOSKELETAL NEGATIVE: 1
EYES NEGATIVE: 1
NEUROLOGICAL NEGATIVE: 1
FEVER: 1
CARDIOVASCULAR NEGATIVE: 1
ABDOMINAL PAIN: 1
VOMITING: 1
CHILLS: 1
RESPIRATORY NEGATIVE: 1

## 2019-11-03 ASSESSMENT — LIFESTYLE VARIABLES
EVER HAD A DRINK FIRST THING IN THE MORNING TO STEADY YOUR NERVES TO GET RID OF A HANGOVER: NO
TOTAL SCORE: 0
TOTAL SCORE: 0
CONSUMPTION TOTAL: INCOMPLETE
TOTAL SCORE: 0
DO YOU DRINK ALCOHOL: NO
EVER FELT BAD OR GUILTY ABOUT YOUR DRINKING: NO
DOES PATIENT WANT TO STOP DRINKING: NO
HAVE YOU EVER FELT YOU SHOULD CUT DOWN ON YOUR DRINKING: NO
HAVE PEOPLE ANNOYED YOU BY CRITICIZING YOUR DRINKING: NO

## 2019-11-04 ENCOUNTER — APPOINTMENT (OUTPATIENT)
Dept: RADIOLOGY | Facility: MEDICAL CENTER | Age: 24
DRG: 682 | End: 2019-11-04
Attending: NURSE PRACTITIONER
Payer: COMMERCIAL

## 2019-11-04 PROBLEM — R10.9 AP (ABDOMINAL PAIN): Status: ACTIVE | Noted: 2019-11-04

## 2019-11-04 PROBLEM — M62.82 RHABDOMYOLYSIS: Status: ACTIVE | Noted: 2019-11-04

## 2019-11-04 PROBLEM — D64.9 NORMOCYTIC ANEMIA: Status: ACTIVE | Noted: 2019-11-04

## 2019-11-04 LAB
25(OH)D3 SERPL-MCNC: 21 NG/ML (ref 30–100)
ANION GAP SERPL CALC-SCNC: 23 MMOL/L (ref 0–11.9)
APPEARANCE UR: CLEAR
BACTERIA #/AREA URNS HPF: NEGATIVE /HPF
BASOPHILS # BLD AUTO: 0.3 % (ref 0–1.8)
BASOPHILS # BLD: 0.03 K/UL (ref 0–0.12)
BILIRUB UR QL STRIP.AUTO: NEGATIVE
BUN SERPL-MCNC: 71 MG/DL (ref 8–22)
CA-I SERPL-SCNC: 1 MMOL/L (ref 1.1–1.3)
CALCIUM SERPL-MCNC: 7.6 MG/DL (ref 8.5–10.5)
CHLORIDE SERPL-SCNC: 99 MMOL/L (ref 96–112)
CO2 SERPL-SCNC: 16 MMOL/L (ref 20–33)
COLOR UR: YELLOW
CREAT SERPL-MCNC: 11.9 MG/DL (ref 0.5–1.4)
CREAT UR-MCNC: 93.7 MG/DL
EKG IMPRESSION: NORMAL
EOSINOPHIL # BLD AUTO: 0.01 K/UL (ref 0–0.51)
EOSINOPHIL NFR BLD: 0.1 % (ref 0–6.9)
EPI CELLS #/AREA URNS HPF: ABNORMAL /HPF
ERYTHROCYTE [DISTWIDTH] IN BLOOD BY AUTOMATED COUNT: 39.1 FL (ref 35.9–50)
GLUCOSE SERPL-MCNC: 115 MG/DL (ref 65–99)
GLUCOSE UR STRIP.AUTO-MCNC: NEGATIVE MG/DL
HCT VFR BLD AUTO: 39 % (ref 42–52)
HGB BLD-MCNC: 13.5 G/DL (ref 14–18)
HYALINE CASTS #/AREA URNS LPF: ABNORMAL /LPF
IMM GRANULOCYTES # BLD AUTO: 0.04 K/UL (ref 0–0.11)
IMM GRANULOCYTES NFR BLD AUTO: 0.4 % (ref 0–0.9)
KETONES UR STRIP.AUTO-MCNC: NEGATIVE MG/DL
LEUKOCYTE ESTERASE UR QL STRIP.AUTO: ABNORMAL
LYMPHOCYTES # BLD AUTO: 1.99 K/UL (ref 1–4.8)
LYMPHOCYTES NFR BLD: 17.8 % (ref 22–41)
MAGNESIUM SERPL-MCNC: 3.2 MG/DL (ref 1.5–2.5)
MCH RBC QN AUTO: 30.9 PG (ref 27–33)
MCHC RBC AUTO-ENTMCNC: 34.6 G/DL (ref 33.7–35.3)
MCV RBC AUTO: 89.2 FL (ref 81.4–97.8)
MICRO URNS: ABNORMAL
MONOCYTES # BLD AUTO: 1.02 K/UL (ref 0–0.85)
MONOCYTES NFR BLD AUTO: 9.1 % (ref 0–13.4)
NEUTROPHILS # BLD AUTO: 8.07 K/UL (ref 1.82–7.42)
NEUTROPHILS NFR BLD: 72.3 % (ref 44–72)
NITRITE UR QL STRIP.AUTO: NEGATIVE
NRBC # BLD AUTO: 0 K/UL
NRBC BLD-RTO: 0 /100 WBC
PH UR STRIP.AUTO: 6 [PH] (ref 5–8)
PHOSPHATE SERPL-MCNC: 9.8 MG/DL (ref 2.5–4.5)
PLATELET # BLD AUTO: 227 K/UL (ref 164–446)
PMV BLD AUTO: 9.5 FL (ref 9–12.9)
POTASSIUM SERPL-SCNC: 4.2 MMOL/L (ref 3.6–5.5)
PROT UR QL STRIP: 100 MG/DL
PROT UR-MCNC: 80.8 MG/DL (ref 0–15)
PTH-INTACT SERPL-MCNC: 355.5 PG/ML (ref 14–72)
RBC # BLD AUTO: 4.37 M/UL (ref 4.7–6.1)
RBC # URNS HPF: ABNORMAL /HPF
RBC UR QL AUTO: ABNORMAL
SODIUM SERPL-SCNC: 138 MMOL/L (ref 135–145)
SODIUM UR-SCNC: 45 MMOL/L
SP GR UR STRIP.AUTO: 1.01
UROBILINOGEN UR STRIP.AUTO-MCNC: 0.2 MG/DL
WBC # BLD AUTO: 11.2 K/UL (ref 4.8–10.8)
WBC #/AREA URNS HPF: ABNORMAL /HPF

## 2019-11-04 PROCEDURE — 84156 ASSAY OF PROTEIN URINE: CPT

## 2019-11-04 PROCEDURE — 700102 HCHG RX REV CODE 250 W/ 637 OVERRIDE(OP): Performed by: HOSPITALIST

## 2019-11-04 PROCEDURE — 93005 ELECTROCARDIOGRAM TRACING: CPT | Performed by: INTERNAL MEDICINE

## 2019-11-04 PROCEDURE — A9270 NON-COVERED ITEM OR SERVICE: HCPCS | Performed by: HOSPITALIST

## 2019-11-04 PROCEDURE — 700111 HCHG RX REV CODE 636 W/ 250 OVERRIDE (IP): Performed by: INTERNAL MEDICINE

## 2019-11-04 PROCEDURE — 84300 ASSAY OF URINE SODIUM: CPT

## 2019-11-04 PROCEDURE — 80307 DRUG TEST PRSMV CHEM ANLYZR: CPT

## 2019-11-04 PROCEDURE — 700105 HCHG RX REV CODE 258: Performed by: HOSPITALIST

## 2019-11-04 PROCEDURE — 82570 ASSAY OF URINE CREATININE: CPT

## 2019-11-04 PROCEDURE — 700111 HCHG RX REV CODE 636 W/ 250 OVERRIDE (IP): Performed by: HOSPITALIST

## 2019-11-04 PROCEDURE — 76775 US EXAM ABDO BACK WALL LIM: CPT

## 2019-11-04 PROCEDURE — 84100 ASSAY OF PHOSPHORUS: CPT

## 2019-11-04 PROCEDURE — 87086 URINE CULTURE/COLONY COUNT: CPT

## 2019-11-04 PROCEDURE — 80048 BASIC METABOLIC PNL TOTAL CA: CPT

## 2019-11-04 PROCEDURE — 90471 IMMUNIZATION ADMIN: CPT

## 2019-11-04 PROCEDURE — 770006 HCHG ROOM/CARE - MED/SURG/GYN SEMI*

## 2019-11-04 PROCEDURE — 83735 ASSAY OF MAGNESIUM: CPT

## 2019-11-04 PROCEDURE — 85025 COMPLETE CBC W/AUTO DIFF WBC: CPT

## 2019-11-04 PROCEDURE — 700105 HCHG RX REV CODE 258: Performed by: NURSE PRACTITIONER

## 2019-11-04 PROCEDURE — 36415 COLL VENOUS BLD VENIPUNCTURE: CPT

## 2019-11-04 PROCEDURE — 90686 IIV4 VACC NO PRSV 0.5 ML IM: CPT | Performed by: INTERNAL MEDICINE

## 2019-11-04 PROCEDURE — 93010 ELECTROCARDIOGRAM REPORT: CPT | Performed by: INTERNAL MEDICINE

## 2019-11-04 PROCEDURE — 81001 URINALYSIS AUTO W/SCOPE: CPT

## 2019-11-04 RX ORDER — SODIUM CHLORIDE, SODIUM LACTATE, POTASSIUM CHLORIDE, CALCIUM CHLORIDE 600; 310; 30; 20 MG/100ML; MG/100ML; MG/100ML; MG/100ML
INJECTION, SOLUTION INTRAVENOUS CONTINUOUS
Status: DISCONTINUED | OUTPATIENT
Start: 2019-11-04 | End: 2019-11-05

## 2019-11-04 RX ORDER — FAMOTIDINE 20 MG/1
20 TABLET, FILM COATED ORAL DAILY
Status: DISCONTINUED | OUTPATIENT
Start: 2019-11-04 | End: 2019-11-08 | Stop reason: HOSPADM

## 2019-11-04 RX ADMIN — OXYCODONE HYDROCHLORIDE 5 MG: 5 TABLET ORAL at 10:45

## 2019-11-04 RX ADMIN — SENNOSIDES AND DOCUSATE SODIUM 2 TABLET: 8.6; 5 TABLET ORAL at 04:24

## 2019-11-04 RX ADMIN — OXYCODONE HYDROCHLORIDE 5 MG: 5 TABLET ORAL at 18:20

## 2019-11-04 RX ADMIN — AMPICILLIN AND SULBACTAM 3 G: 1; 2 INJECTION, POWDER, FOR SOLUTION INTRAMUSCULAR; INTRAVENOUS at 00:09

## 2019-11-04 RX ADMIN — SODIUM CHLORIDE, POTASSIUM CHLORIDE, SODIUM LACTATE AND CALCIUM CHLORIDE: 600; 310; 30; 20 INJECTION, SOLUTION INTRAVENOUS at 12:13

## 2019-11-04 RX ADMIN — INFLUENZA A VIRUS A/BRISBANE/02/2018 IVR-190 (H1N1) ANTIGEN (FORMALDEHYDE INACTIVATED), INFLUENZA A VIRUS A/KANSAS/14/2017 X-327 (H3N2) ANTIGEN (FORMALDEHYDE INACTIVATED), INFLUENZA B VIRUS B/PHUKET/3073/2013 ANTIGEN (FORMALDEHYDE INACTIVATED), AND INFLUENZA B VIRUS B/MARYLAND/15/2016 BX-69A ANTIGEN (FORMALDEHYDE INACTIVATED) 0.5 ML: 15; 15; 15; 15 INJECTION, SUSPENSION INTRAMUSCULAR at 12:42

## 2019-11-04 RX ADMIN — HEPARIN SODIUM 5000 UNITS: 5000 INJECTION, SOLUTION INTRAVENOUS; SUBCUTANEOUS at 04:24

## 2019-11-04 RX ADMIN — OXYCODONE HYDROCHLORIDE 5 MG: 5 TABLET ORAL at 07:40

## 2019-11-04 RX ADMIN — ONDANSETRON 4 MG: 2 INJECTION INTRAMUSCULAR; INTRAVENOUS at 12:11

## 2019-11-04 RX ADMIN — OXYCODONE HYDROCHLORIDE 5 MG: 5 TABLET ORAL at 03:30

## 2019-11-04 RX ADMIN — OXYCODONE HYDROCHLORIDE 5 MG: 5 TABLET ORAL at 21:52

## 2019-11-04 RX ADMIN — SODIUM BICARBONATE: 84 INJECTION, SOLUTION INTRAVENOUS at 00:50

## 2019-11-04 RX ADMIN — SODIUM CHLORIDE, POTASSIUM CHLORIDE, SODIUM LACTATE AND CALCIUM CHLORIDE: 600; 310; 30; 20 INJECTION, SOLUTION INTRAVENOUS at 21:19

## 2019-11-04 RX ADMIN — AMPICILLIN AND SULBACTAM 3 G: 1; 2 INJECTION, POWDER, FOR SOLUTION INTRAMUSCULAR; INTRAVENOUS at 18:13

## 2019-11-04 RX ADMIN — SODIUM BICARBONATE: 84 INJECTION, SOLUTION INTRAVENOUS at 08:05

## 2019-11-04 ASSESSMENT — LIFESTYLE VARIABLES
HAVE YOU EVER FELT YOU SHOULD CUT DOWN ON YOUR DRINKING: NO
EVER FELT BAD OR GUILTY ABOUT YOUR DRINKING: NO
EVER_SMOKED: YES
TOTAL SCORE: 0
HAVE PEOPLE ANNOYED YOU BY CRITICIZING YOUR DRINKING: NO
ON A TYPICAL DAY WHEN YOU DRINK ALCOHOL HOW MANY DRINKS DO YOU HAVE: 1
AVERAGE NUMBER OF DAYS PER WEEK YOU HAVE A DRINK CONTAINING ALCOHOL: 0
CONSUMPTION TOTAL: NEGATIVE
HOW MANY TIMES IN THE PAST YEAR HAVE YOU HAD 5 OR MORE DRINKS IN A DAY: 0
DOES PATIENT WANT TO STOP DRINKING: NO
ALCOHOL_USE: YES
TOTAL SCORE: 0
TOTAL SCORE: 0
EVER HAD A DRINK FIRST THING IN THE MORNING TO STEADY YOUR NERVES TO GET RID OF A HANGOVER: NO

## 2019-11-04 ASSESSMENT — ENCOUNTER SYMPTOMS
BLOOD IN STOOL: 0
CHILLS: 0
BACK PAIN: 0
NECK PAIN: 0
ORTHOPNEA: 0
WEIGHT LOSS: 0
COUGH: 0
DIARRHEA: 0
HEARTBURN: 0
FEVER: 0
PALPITATIONS: 0
ABDOMINAL PAIN: 0
SHORTNESS OF BREATH: 0
MYALGIAS: 0
FLANK PAIN: 0
VOMITING: 1
NAUSEA: 1
WHEEZING: 0

## 2019-11-04 ASSESSMENT — COGNITIVE AND FUNCTIONAL STATUS - GENERAL
SUGGESTED CMS G CODE MODIFIER DAILY ACTIVITY: CH
SUGGESTED CMS G CODE MODIFIER MOBILITY: CH
DAILY ACTIVITIY SCORE: 24
MOBILITY SCORE: 24

## 2019-11-04 ASSESSMENT — PATIENT HEALTH QUESTIONNAIRE - PHQ9
1. LITTLE INTEREST OR PLEASURE IN DOING THINGS: NOT AT ALL
2. FEELING DOWN, DEPRESSED, IRRITABLE, OR HOPELESS: NOT AT ALL
SUM OF ALL RESPONSES TO PHQ9 QUESTIONS 1 AND 2: 0

## 2019-11-04 NOTE — CARE PLAN
Problem: Communication  Goal: The ability to communicate needs accurately and effectively will improve  Outcome: PROGRESSING AS EXPECTED  Note:   Patient understands when to call for assistance and symptoms to report to nursing.     Problem: Infection  Goal: Will remain free from infection  Outcome: PROGRESSING AS EXPECTED  Note:   IV antibiotics already ordered.     Problem: Fluid Volume:  Goal: Will maintain balanced intake and output  Outcome: PROGRESSING AS EXPECTED  Note:   Continuous IV fluids ordered.  Nephrology consult ordered.     Problem: Pain Management  Goal: Pain level will decrease to patient's comfort goal  Outcome: PROGRESSING AS EXPECTED  Note:   Patient understands pain scale and asks for PRN medication appropriately.
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
## 2019-11-04 NOTE — ASSESSMENT & PLAN NOTE
Acidosis resolved with elevated anion gap-off sodium bicarb drip.  Lactic acid level normal.  AGMA improved .  Monitor.

## 2019-11-04 NOTE — ED NOTES
Med rec updated and complete. Allergies reviewed.   Pt denies taking medications. Pt denies antibiotic use  In last 14 days.  [facility]

## 2019-11-04 NOTE — ASSESSMENT & PLAN NOTE
Received Tdap in the ER.  Initial concern for wound infection-- wound stable. Afebrile.   Started on renal dosed IV Unasyn.    Continue with  Augmentin for a total of  7 days  Wound care
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
## 2019-11-04 NOTE — CARE PLAN
Problem: Communication  Goal: The ability to communicate needs accurately and effectively will improve  Outcome: PROGRESSING AS EXPECTED  Intervention: Educate patient and significant other/support system about the plan of care, procedures, treatments, medications and allow for questions  Note:   Discussed plan of care for duration of shift, pt acknowledges understanding.     Problem: Infection  Goal: Will remain free from infection  Outcome: PROGRESSING AS EXPECTED  Intervention: Assess signs and symptoms of infection  Note:   Patient will not exhibit any worsening signs of infection during shift. Administer IV abx as prescribed. Monitor labs and VS.

## 2019-11-04 NOTE — DISCHARGE PLANNING
Care Transition Team Assessment    Anticipated Discharge Disposition: home no needs    Action: Meet with pt at bedside and discussed pt in IDT rounds. Per provider, pt admission to continue trending kidney function labs. UA pending. Pt is uninsured, medical financial hardship pending. Pt educated on need to follow up at [facility] for PCP needs. Pt declined to add a emergency contact. Facesheet information verified.     Barriers to Discharge: medical clearance, kidney function and UA    Plan: pending clinical course      Length of Stay: 1    Information Source  Orientation : Oriented x 4  Information Given By: Patient  Informant's Name: Keith Elaine  Who is responsible for making decisions for patient? : Patient    Readmission Evaluation  Is this a readmission?: No    Elopement Risk  Legal Hold: No  Ambulatory or Self Mobile in Wheelchair: Yes  Disoriented: No  Psychiatric Symptoms: None  History of Wandering: No  Elopement this Admit: No  Vocalizing Wanting to Leave: No  Displays Behaviors, Body Language Wanting to Leave: No-Not at Risk for Elopement  Elopement Risk: Not at Risk for Elopement    Interdisciplinary Discharge Planning  Patient or legal guardian wants to designate a caregiver (see row info): No    Discharge Preparedness  What is your plan after discharge?: Home with help  What are your discharge supports?: Other (comment)(friends)  Prior Functional Level: Ambulatory  Difficulity with ADLs: None  Difficulity with IADLs: None    Functional Assesment  Prior Functional Level: Ambulatory    Finances  Financial Barriers to Discharge: Yes  Average Monthly Income: (medical hardship pending)  Prescription Coverage: No  Prescription Coverage Comments: no insurance    Vision / Hearing Impairment  Vision Impairment : Yes  Right Eye Vision: Impaired  Left Eye Vision: Impaired  Hearing Impairment : No         Advance Directive  Advance Directive?: None  Advance Directive offered?: AD Booklet  refused    Domestic Abuse  Have you ever been the victim of abuse or violence?: No  Physical Abuse or Sexual Abuse: No  Verbal Abuse or Emotional Abuse: No  Possible Abuse Reported to:: Not Applicable    Psychological Assessment  History of Substance Abuse: None  History of Psychiatric Problems: No  Non-compliant with Treatment: No  Newly Diagnosed Illness: No    Discharge Risks or Barriers  Discharge risks or barriers?: No PCP, Uninsured / underinsured  Patient risk factors: No PCP    Anticipated Discharge Information  Anticipated discharge disposition: Home  Discharge Address: [address] [place] [address] [place] [address]  Discharge Contact Phone Number: [phone]
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
## 2019-11-04 NOTE — ASSESSMENT & PLAN NOTE
Currently resolved-abdominal exam benign  Patient had abnormal ultrasound revealing no acute changes.  Renal ultrasound with echogenic debris in the bladder-UA negative for signs of infection  For clinically

## 2019-11-04 NOTE — ASSESSMENT & PLAN NOTE
Blood pressure stable.  Urine revealed few eosinophils . Suspect multifactorial-possible component of NSAIDs / illicit drug use w  Nephritis/dehydration with tubular injury-- good UOP.  Creatinine improved  Dose all medications for GFR less than 15  Avoid nephrotoxic drugs  Hyperphosphatemic - Renvela  Replete low K.  Continue monitor renal function, electrolytes, urine output.  Anticipate should fully recover -- I discussed with nephrology.
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
## 2019-11-05 ENCOUNTER — APPOINTMENT (OUTPATIENT)
Dept: RADIOLOGY | Facility: MEDICAL CENTER | Age: 24
DRG: 682 | End: 2019-11-05
Attending: HOSPITALIST
Payer: COMMERCIAL

## 2019-11-05 PROBLEM — R79.89 ELEVATED TROPONIN: Status: ACTIVE | Noted: 2019-11-05

## 2019-11-05 PROBLEM — J96.00 ACUTE RESPIRATORY FAILURE (HCC): Status: ACTIVE | Noted: 2019-11-05

## 2019-11-05 PROBLEM — F14.10 COCAINE ABUSE (HCC): Status: ACTIVE | Noted: 2019-11-05

## 2019-11-05 LAB
25(OH)D3 SERPL-MCNC: 10 NG/ML (ref 30–100)
ALBUMIN SERPL BCP-MCNC: 3.9 G/DL (ref 3.2–4.9)
ALBUMIN/GLOB SERPL: 1.4 G/DL
ALP SERPL-CCNC: 51 U/L (ref 30–99)
ALT SERPL-CCNC: 84 U/L (ref 2–50)
AMPHET UR QL SCN: NEGATIVE
ANION GAP SERPL CALC-SCNC: 19 MMOL/L (ref 0–11.9)
ANION GAP SERPL CALC-SCNC: 24 MMOL/L (ref 0–11.9)
AST SERPL-CCNC: 45 U/L (ref 12–45)
BARBITURATES UR QL SCN: NEGATIVE
BASOPHILS # BLD AUTO: 0.3 % (ref 0–1.8)
BASOPHILS # BLD: 0.04 K/UL (ref 0–0.12)
BENZODIAZ UR QL SCN: NEGATIVE
BILIRUB SERPL-MCNC: 0.6 MG/DL (ref 0.1–1.5)
BUN SERPL-MCNC: 81 MG/DL (ref 8–22)
BUN SERPL-MCNC: 83 MG/DL (ref 8–22)
BZE UR QL SCN: POSITIVE
CALCIUM SERPL-MCNC: 7.5 MG/DL (ref 8.5–10.5)
CALCIUM SERPL-MCNC: 8.1 MG/DL (ref 8.5–10.5)
CANNABINOIDS UR QL SCN: NEGATIVE
CHLORIDE SERPL-SCNC: 96 MMOL/L (ref 96–112)
CHLORIDE SERPL-SCNC: 97 MMOL/L (ref 96–112)
CK SERPL-CCNC: 626 U/L (ref 0–154)
CO2 SERPL-SCNC: 20 MMOL/L (ref 20–33)
CO2 SERPL-SCNC: 22 MMOL/L (ref 20–33)
CREAT SERPL-MCNC: 11.52 MG/DL (ref 0.5–1.4)
CREAT SERPL-MCNC: 12.7 MG/DL (ref 0.5–1.4)
EKG IMPRESSION: NORMAL
EOSINOPHIL # BLD AUTO: 0 K/UL (ref 0–0.51)
EOSINOPHIL NFR BLD: 0 % (ref 0–6.9)
EOSINOPHIL URNS QL MICRO: ABNORMAL
ERYTHROCYTE [DISTWIDTH] IN BLOOD BY AUTOMATED COUNT: 38.4 FL (ref 35.9–50)
FERRITIN SERPL-MCNC: 226.7 NG/ML (ref 22–322)
GLOBULIN SER CALC-MCNC: 2.7 G/DL (ref 1.9–3.5)
GLUCOSE SERPL-MCNC: 103 MG/DL (ref 65–99)
GLUCOSE SERPL-MCNC: 96 MG/DL (ref 65–99)
HCT VFR BLD AUTO: 36.5 % (ref 42–52)
HGB BLD-MCNC: 12.7 G/DL (ref 14–18)
IMM GRANULOCYTES # BLD AUTO: 0.04 K/UL (ref 0–0.11)
IMM GRANULOCYTES NFR BLD AUTO: 0.3 % (ref 0–0.9)
IRON SATN MFR SERPL: 28 % (ref 15–55)
IRON SERPL-MCNC: 73 UG/DL (ref 50–180)
LYMPHOCYTES # BLD AUTO: 1.05 K/UL (ref 1–4.8)
LYMPHOCYTES NFR BLD: 8.9 % (ref 22–41)
MAGNESIUM SERPL-MCNC: 2.8 MG/DL (ref 1.5–2.5)
MCH RBC QN AUTO: 30.5 PG (ref 27–33)
MCHC RBC AUTO-ENTMCNC: 34.8 G/DL (ref 33.7–35.3)
MCV RBC AUTO: 87.7 FL (ref 81.4–97.8)
METHADONE UR QL SCN: NEGATIVE
MONOCYTES # BLD AUTO: 0.92 K/UL (ref 0–0.85)
MONOCYTES NFR BLD AUTO: 7.8 % (ref 0–13.4)
NEUTROPHILS # BLD AUTO: 9.8 K/UL (ref 1.82–7.42)
NEUTROPHILS NFR BLD: 82.7 % (ref 44–72)
NRBC # BLD AUTO: 0 K/UL
NRBC BLD-RTO: 0 /100 WBC
NT-PROBNP SERPL IA-MCNC: ABNORMAL PG/ML (ref 0–125)
OPIATES UR QL SCN: POSITIVE
OXYCODONE UR QL SCN: POSITIVE
PCP UR QL SCN: NEGATIVE
PHOSPHATE SERPL-MCNC: 10.5 MG/DL (ref 2.5–4.5)
PLATELET # BLD AUTO: 248 K/UL (ref 164–446)
PMV BLD AUTO: 9.8 FL (ref 9–12.9)
POTASSIUM SERPL-SCNC: 3.6 MMOL/L (ref 3.6–5.5)
POTASSIUM SERPL-SCNC: 3.9 MMOL/L (ref 3.6–5.5)
PROPOXYPH UR QL SCN: NEGATIVE
PROT SERPL-MCNC: 6.6 G/DL (ref 6–8.2)
PTH-INTACT SERPL-MCNC: 370.8 PG/ML (ref 14–72)
RBC # BLD AUTO: 4.16 M/UL (ref 4.7–6.1)
SODIUM SERPL-SCNC: 138 MMOL/L (ref 135–145)
SODIUM SERPL-SCNC: 140 MMOL/L (ref 135–145)
TIBC SERPL-MCNC: 263 UG/DL (ref 250–450)
TROPONIN T SERPL-MCNC: 38 NG/L (ref 6–19)
WBC # BLD AUTO: 11.9 K/UL (ref 4.8–10.8)

## 2019-11-05 PROCEDURE — 83540 ASSAY OF IRON: CPT

## 2019-11-05 PROCEDURE — 83735 ASSAY OF MAGNESIUM: CPT

## 2019-11-05 PROCEDURE — 700105 HCHG RX REV CODE 258: Performed by: HOSPITALIST

## 2019-11-05 PROCEDURE — 700111 HCHG RX REV CODE 636 W/ 250 OVERRIDE (IP): Performed by: INTERNAL MEDICINE

## 2019-11-05 PROCEDURE — 84484 ASSAY OF TROPONIN QUANT: CPT

## 2019-11-05 PROCEDURE — 700102 HCHG RX REV CODE 250 W/ 637 OVERRIDE(OP): Performed by: NURSE PRACTITIONER

## 2019-11-05 PROCEDURE — 82306 VITAMIN D 25 HYDROXY: CPT

## 2019-11-05 PROCEDURE — 83550 IRON BINDING TEST: CPT

## 2019-11-05 PROCEDURE — 82550 ASSAY OF CK (CPK): CPT

## 2019-11-05 PROCEDURE — 83970 ASSAY OF PARATHORMONE: CPT

## 2019-11-05 PROCEDURE — 82728 ASSAY OF FERRITIN: CPT

## 2019-11-05 PROCEDURE — 89190 NASAL SMEAR FOR EOSINOPHILS: CPT

## 2019-11-05 PROCEDURE — 700111 HCHG RX REV CODE 636 W/ 250 OVERRIDE (IP): Performed by: HOSPITALIST

## 2019-11-05 PROCEDURE — 80048 BASIC METABOLIC PNL TOTAL CA: CPT

## 2019-11-05 PROCEDURE — 700102 HCHG RX REV CODE 250 W/ 637 OVERRIDE(OP): Performed by: INTERNAL MEDICINE

## 2019-11-05 PROCEDURE — 770006 HCHG ROOM/CARE - MED/SURG/GYN SEMI*

## 2019-11-05 PROCEDURE — 700102 HCHG RX REV CODE 250 W/ 637 OVERRIDE(OP): Performed by: HOSPITALIST

## 2019-11-05 PROCEDURE — A9270 NON-COVERED ITEM OR SERVICE: HCPCS | Performed by: HOSPITALIST

## 2019-11-05 PROCEDURE — 700105 HCHG RX REV CODE 258: Performed by: NURSE PRACTITIONER

## 2019-11-05 PROCEDURE — 80053 COMPREHEN METABOLIC PANEL: CPT

## 2019-11-05 PROCEDURE — 99233 SBSQ HOSP IP/OBS HIGH 50: CPT | Performed by: HOSPITALIST

## 2019-11-05 PROCEDURE — 93010 ELECTROCARDIOGRAM REPORT: CPT | Performed by: INTERNAL MEDICINE

## 2019-11-05 PROCEDURE — 93005 ELECTROCARDIOGRAM TRACING: CPT | Performed by: HOSPITALIST

## 2019-11-05 PROCEDURE — 84100 ASSAY OF PHOSPHORUS: CPT

## 2019-11-05 PROCEDURE — A9270 NON-COVERED ITEM OR SERVICE: HCPCS | Performed by: INTERNAL MEDICINE

## 2019-11-05 PROCEDURE — 71045 X-RAY EXAM CHEST 1 VIEW: CPT

## 2019-11-05 PROCEDURE — 85025 COMPLETE CBC W/AUTO DIFF WBC: CPT

## 2019-11-05 PROCEDURE — A9270 NON-COVERED ITEM OR SERVICE: HCPCS | Performed by: NURSE PRACTITIONER

## 2019-11-05 PROCEDURE — 36415 COLL VENOUS BLD VENIPUNCTURE: CPT

## 2019-11-05 PROCEDURE — 94760 N-INVAS EAR/PLS OXIMETRY 1: CPT

## 2019-11-05 PROCEDURE — 83880 ASSAY OF NATRIURETIC PEPTIDE: CPT

## 2019-11-05 RX ORDER — FUROSEMIDE 10 MG/ML
40 INJECTION INTRAMUSCULAR; INTRAVENOUS ONCE
Status: COMPLETED | OUTPATIENT
Start: 2019-11-05 | End: 2019-11-05

## 2019-11-05 RX ORDER — SEVELAMER CARBONATE 800 MG/1
1600 TABLET, FILM COATED ORAL
Status: DISCONTINUED | OUTPATIENT
Start: 2019-11-05 | End: 2019-11-07

## 2019-11-05 RX ORDER — AMOXICILLIN AND CLAVULANATE POTASSIUM 500; 125 MG/1; MG/1
1 TABLET, FILM COATED ORAL EVERY 24 HOURS
Status: DISCONTINUED | OUTPATIENT
Start: 2019-11-06 | End: 2019-11-08 | Stop reason: HOSPADM

## 2019-11-05 RX ADMIN — FAMOTIDINE 20 MG: 20 TABLET ORAL at 05:26

## 2019-11-05 RX ADMIN — OXYCODONE HYDROCHLORIDE 5 MG: 5 TABLET ORAL at 16:30

## 2019-11-05 RX ADMIN — HEPARIN SODIUM 5000 UNITS: 5000 INJECTION, SOLUTION INTRAVENOUS; SUBCUTANEOUS at 22:18

## 2019-11-05 RX ADMIN — FUROSEMIDE 40 MG: 10 INJECTION, SOLUTION INTRAMUSCULAR; INTRAVENOUS at 17:53

## 2019-11-05 RX ADMIN — ONDANSETRON 4 MG: 2 INJECTION INTRAMUSCULAR; INTRAVENOUS at 15:39

## 2019-11-05 RX ADMIN — MORPHINE SULFATE 2 MG: 4 INJECTION INTRAVENOUS at 16:00

## 2019-11-05 RX ADMIN — ONDANSETRON 4 MG: 4 TABLET, ORALLY DISINTEGRATING ORAL at 05:23

## 2019-11-05 RX ADMIN — SEVELAMER CARBONATE 1600 MG: 800 TABLET, FILM COATED ORAL at 12:13

## 2019-11-05 RX ADMIN — SEVELAMER CARBONATE 1600 MG: 800 TABLET, FILM COATED ORAL at 17:52

## 2019-11-05 RX ADMIN — AMPICILLIN AND SULBACTAM 3 G: 1; 2 INJECTION, POWDER, FOR SOLUTION INTRAMUSCULAR; INTRAVENOUS at 17:49

## 2019-11-05 RX ADMIN — FUROSEMIDE 40 MG: 10 INJECTION, SOLUTION INTRAMUSCULAR; INTRAVENOUS at 15:43

## 2019-11-05 RX ADMIN — SODIUM CHLORIDE, POTASSIUM CHLORIDE, SODIUM LACTATE AND CALCIUM CHLORIDE: 600; 310; 30; 20 INJECTION, SOLUTION INTRAVENOUS at 05:32

## 2019-11-05 RX ADMIN — OXYCODONE HYDROCHLORIDE 5 MG: 5 TABLET ORAL at 00:38

## 2019-11-05 RX ADMIN — OXYCODONE HYDROCHLORIDE 5 MG: 5 TABLET ORAL at 20:20

## 2019-11-05 RX ADMIN — CLONIDINE HYDROCHLORIDE 0.1 MG: 0.1 TABLET ORAL at 05:25

## 2019-11-05 ASSESSMENT — LIFESTYLE VARIABLES
SUBSTANCE_ABUSE: 1
EVER_SMOKED: YES

## 2019-11-05 ASSESSMENT — ENCOUNTER SYMPTOMS
FEVER: 0
BACK PAIN: 0
DIARRHEA: 0
EYE REDNESS: 0
PALPITATIONS: 0
SPEECH CHANGE: 0
COUGH: 0
ABDOMINAL PAIN: 0
WEAKNESS: 0
STRIDOR: 0
FOCAL WEAKNESS: 0
WHEEZING: 0
DIAPHORESIS: 0
HALLUCINATIONS: 0
TREMORS: 0
VOMITING: 0
EYE DISCHARGE: 0
SENSORY CHANGE: 0
CHILLS: 0
NECK PAIN: 0
SHORTNESS OF BREATH: 1
NERVOUS/ANXIOUS: 1
FLANK PAIN: 0

## 2019-11-05 ASSESSMENT — COPD QUESTIONNAIRES
DO YOU EVER COUGH UP ANY MUCUS OR PHLEGM?: YES, EVERY DAY
COPD SCREENING SCORE: 4
HAVE YOU SMOKED AT LEAST 100 CIGARETTES IN YOUR ENTIRE LIFE: YES
DURING THE PAST 4 WEEKS HOW MUCH DID YOU FEEL SHORT OF BREATH: NONE/LITTLE OF THE TIME

## 2019-11-05 NOTE — PROGRESS NOTES
VSS.  Pain improving per patient report.  Now with headache.  Emesis x2 today.  Patient states gagging and vomiting d/t post nasal drip and drainage.  Urine sent for all tests ordered.  IV fluids switched to LR at 125 ml/hr.  Patient educated on need for strict intake and output.

## 2019-11-05 NOTE — PROGRESS NOTES
Called to patient's room.  C/o 6/10 upper abdomen and midsternal pressure.  VSS on 3L NC.  Dr. Kaur notified.  STAT EKG and troponin ordered.  Charge nurse informed.

## 2019-11-05 NOTE — PROGRESS NOTES
Room air hypoxia, 76% on RA.  Denies SOB, only c/o congestion and drainage. Stable on 3L NC, 93%. Dr. Kaur notified. MD also aware of concerning lab work from this AM. New orders for CXR and RT assessment. Attempted to call RT with no answer, will tray again shortly.

## 2019-11-05 NOTE — ASSESSMENT & PLAN NOTE
Due to acute pulmonary edema-possible component of hypervolemia.  Possible underlying heart failure.  Improved dyspnea.  Post IV Lasix.  No signs for heart failure-- Echo with normal LV, valve function.   Monitor urine output  Oral Lasix x 1  O2 nasal cannula --- wean as tolerated
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
## 2019-11-05 NOTE — CARE PLAN
Problem: Infection  Goal: Will remain free from infection  Outcome: PROGRESSING SLOWER THAN EXPECTED  Note:   WBC continues to be elevated. Low grade fever. IV antibiotics continue.     Problem: Fluid Volume:  Goal: Will maintain balanced intake and output  Outcome: PROGRESSING SLOWER THAN EXPECTED  Note:   Creatinine remains elevated. Nephrology changed continuous IV fluid order to 75 ml/hr.

## 2019-11-05 NOTE — PROGRESS NOTES
Patient had another episode of emesis in the AM. Pt believes it is due to congestion. PRN Zofran given but ineffective. Pt was able to keep Clonidine and famotidine down. PRN clonidine given for elevated BP of 170/99. Ice chips provided.

## 2019-11-05 NOTE — ASSESSMENT & PLAN NOTE
Risk of use discussed at length with patient-plan cardiac work-up as above  Continue cessation counseling

## 2019-11-05 NOTE — PROGRESS NOTES
Patient A/Ox4, up self. Complains of pain, medicated per MAR. Pt had one episode of vomiting right after receiving a PO medication, did not want to replace with another dose. Ice pack applied for headache instead. Pt did get a bloody nose during shift, pt says it was due to the dry air. Will speak with day RN to try and obtain PRN saline spray. IVF of LR infusing at 125 mL/hr. Reinforced the need for strict I&O w/ pt, pt acknowledges understanding. Bed in low and locked position. No signs of acute distress.

## 2019-11-05 NOTE — PROGRESS NOTES
Sameer from Lab called with critical result of Phosphorus at 10.5. Critical lab result read back to Sameer.   Dr. JASVIR Sharp notified of critical lab result at 0430.  Critical lab result read back by Dr. JASVIR Sharp.    No new orders
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
## 2019-11-05 NOTE — DISCHARGE PLANNING
Anticipated Discharge Disposition: home no needs    Action: Discussed pt in IDT rounds. Per wound team, pt may need follow up wound care. Per MD, cardiac work up to be complete r/t pts complains of chest pain and pt cocaine use.     Barriers to Discharge: medical clearance: chest pain, wound care, CORY    Plan: access pt wound care needs, trend pt kidney function, cardiac workup      Length of Stay: 2

## 2019-11-05 NOTE — ASSESSMENT & PLAN NOTE
Elevated troponin-likely associated with mild rhabdomyolysis, acute renal failure.  EKG with no ischemic changes.  Echo preserved LV function.  Had chest discomfort likely from acute pulmonary edema, dyspnea.  Pain symptoms resolved

## 2019-11-05 NOTE — PROGRESS NOTES
"1455: Called to patient's room.  Patient stated he was feeling short of breath and felt like he was \"bloated\" and could not take a deep breath.  Patient stated he felt the same chest pressure as earlier and that he needed the IV fluids stopped because he could not breathe.  Patient stated he was in \"agony\".  Dr. Kaur notified.  Rapid response called for secondary assessment.  RT notified of patient complaints.    1500: Echo ordered as well as STAT BMP and BNP.  92% on 3L NC.  Dr. Kaur to contact nephrology to discuss IV fluids and possibly diuresing today d/t patient symptoms.  "

## 2019-11-06 ENCOUNTER — APPOINTMENT (OUTPATIENT)
Dept: CARDIOLOGY | Facility: MEDICAL CENTER | Age: 24
DRG: 682 | End: 2019-11-06
Attending: HOSPITALIST
Payer: COMMERCIAL

## 2019-11-06 LAB
ALBUMIN SERPL BCP-MCNC: 3.8 G/DL (ref 3.2–4.9)
ALBUMIN/GLOB SERPL: 1.4 G/DL
ALP SERPL-CCNC: 49 U/L (ref 30–99)
ALT SERPL-CCNC: 57 U/L (ref 2–50)
ANION GAP SERPL CALC-SCNC: 18 MMOL/L (ref 0–11.9)
AST SERPL-CCNC: 27 U/L (ref 12–45)
BASOPHILS # BLD AUTO: 0.4 % (ref 0–1.8)
BASOPHILS # BLD: 0.06 K/UL (ref 0–0.12)
BILIRUB SERPL-MCNC: 0.7 MG/DL (ref 0.1–1.5)
BUN SERPL-MCNC: 87 MG/DL (ref 8–22)
CALCIUM SERPL-MCNC: 7.8 MG/DL (ref 8.5–10.5)
CHLORIDE SERPL-SCNC: 96 MMOL/L (ref 96–112)
CK SERPL-CCNC: 428 U/L (ref 0–154)
CO2 SERPL-SCNC: 24 MMOL/L (ref 20–33)
CREAT SERPL-MCNC: 11.08 MG/DL (ref 0.5–1.4)
EOSINOPHIL # BLD AUTO: 0.08 K/UL (ref 0–0.51)
EOSINOPHIL NFR BLD: 0.6 % (ref 0–6.9)
ERYTHROCYTE [DISTWIDTH] IN BLOOD BY AUTOMATED COUNT: 38.6 FL (ref 35.9–50)
GLOBULIN SER CALC-MCNC: 2.8 G/DL (ref 1.9–3.5)
GLUCOSE SERPL-MCNC: 99 MG/DL (ref 65–99)
HCT VFR BLD AUTO: 34.3 % (ref 42–52)
HGB BLD-MCNC: 12.2 G/DL (ref 14–18)
IMM GRANULOCYTES # BLD AUTO: 0.06 K/UL (ref 0–0.11)
IMM GRANULOCYTES NFR BLD AUTO: 0.4 % (ref 0–0.9)
LV EJECT FRACT  99904: 65
LV EJECT FRACT MOD 2C 99903: 72.02
LV EJECT FRACT MOD 4C 99902: 57.26
LV EJECT FRACT MOD BP 99901: 65.93
LYMPHOCYTES # BLD AUTO: 2.01 K/UL (ref 1–4.8)
LYMPHOCYTES NFR BLD: 14.5 % (ref 22–41)
MAGNESIUM SERPL-MCNC: 2.4 MG/DL (ref 1.5–2.5)
MCH RBC QN AUTO: 31.1 PG (ref 27–33)
MCHC RBC AUTO-ENTMCNC: 35.6 G/DL (ref 33.7–35.3)
MCV RBC AUTO: 87.5 FL (ref 81.4–97.8)
MONOCYTES # BLD AUTO: 1.97 K/UL (ref 0–0.85)
MONOCYTES NFR BLD AUTO: 14.2 % (ref 0–13.4)
NEUTROPHILS # BLD AUTO: 9.67 K/UL (ref 1.82–7.42)
NEUTROPHILS NFR BLD: 69.9 % (ref 44–72)
NRBC # BLD AUTO: 0 K/UL
NRBC BLD-RTO: 0 /100 WBC
NT-PROBNP SERPL IA-MCNC: ABNORMAL PG/ML (ref 0–125)
PHOSPHATE SERPL-MCNC: 8 MG/DL (ref 2.5–4.5)
PLATELET # BLD AUTO: 223 K/UL (ref 164–446)
PMV BLD AUTO: 9.6 FL (ref 9–12.9)
POTASSIUM SERPL-SCNC: 3.5 MMOL/L (ref 3.6–5.5)
PROT SERPL-MCNC: 6.6 G/DL (ref 6–8.2)
RBC # BLD AUTO: 3.92 M/UL (ref 4.7–6.1)
SODIUM SERPL-SCNC: 138 MMOL/L (ref 135–145)
WBC # BLD AUTO: 13.9 K/UL (ref 4.8–10.8)

## 2019-11-06 PROCEDURE — A9270 NON-COVERED ITEM OR SERVICE: HCPCS | Performed by: NURSE PRACTITIONER

## 2019-11-06 PROCEDURE — 82550 ASSAY OF CK (CPK): CPT

## 2019-11-06 PROCEDURE — A9270 NON-COVERED ITEM OR SERVICE: HCPCS | Performed by: HOSPITALIST

## 2019-11-06 PROCEDURE — 80053 COMPREHEN METABOLIC PANEL: CPT

## 2019-11-06 PROCEDURE — 93306 TTE W/DOPPLER COMPLETE: CPT

## 2019-11-06 PROCEDURE — A9270 NON-COVERED ITEM OR SERVICE: HCPCS | Performed by: INTERNAL MEDICINE

## 2019-11-06 PROCEDURE — 700102 HCHG RX REV CODE 250 W/ 637 OVERRIDE(OP): Performed by: NURSE PRACTITIONER

## 2019-11-06 PROCEDURE — 97597 DBRDMT OPN WND 1ST 20 CM/<: CPT

## 2019-11-06 PROCEDURE — 99233 SBSQ HOSP IP/OBS HIGH 50: CPT | Performed by: HOSPITALIST

## 2019-11-06 PROCEDURE — 93306 TTE W/DOPPLER COMPLETE: CPT | Mod: 26 | Performed by: INTERNAL MEDICINE

## 2019-11-06 PROCEDURE — 85025 COMPLETE CBC W/AUTO DIFF WBC: CPT

## 2019-11-06 PROCEDURE — 36415 COLL VENOUS BLD VENIPUNCTURE: CPT

## 2019-11-06 PROCEDURE — 770006 HCHG ROOM/CARE - MED/SURG/GYN SEMI*

## 2019-11-06 PROCEDURE — 700102 HCHG RX REV CODE 250 W/ 637 OVERRIDE(OP): Performed by: HOSPITALIST

## 2019-11-06 PROCEDURE — 84100 ASSAY OF PHOSPHORUS: CPT

## 2019-11-06 PROCEDURE — 82010 KETONE BODYS QUAN: CPT

## 2019-11-06 PROCEDURE — 700102 HCHG RX REV CODE 250 W/ 637 OVERRIDE(OP): Performed by: INTERNAL MEDICINE

## 2019-11-06 PROCEDURE — 700111 HCHG RX REV CODE 636 W/ 250 OVERRIDE (IP): Performed by: HOSPITALIST

## 2019-11-06 PROCEDURE — 83735 ASSAY OF MAGNESIUM: CPT

## 2019-11-06 PROCEDURE — 83880 ASSAY OF NATRIURETIC PEPTIDE: CPT

## 2019-11-06 RX ADMIN — SENNOSIDES AND DOCUSATE SODIUM 2 TABLET: 8.6; 5 TABLET ORAL at 17:19

## 2019-11-06 RX ADMIN — POLYETHYLENE GLYCOL 3350 1 PACKET: 17 POWDER, FOR SOLUTION ORAL at 17:24

## 2019-11-06 RX ADMIN — HEPARIN SODIUM 5000 UNITS: 5000 INJECTION, SOLUTION INTRAVENOUS; SUBCUTANEOUS at 14:17

## 2019-11-06 RX ADMIN — SEVELAMER CARBONATE 1600 MG: 800 TABLET, FILM COATED ORAL at 17:18

## 2019-11-06 RX ADMIN — FAMOTIDINE 20 MG: 20 TABLET ORAL at 05:31

## 2019-11-06 RX ADMIN — SENNOSIDES AND DOCUSATE SODIUM 2 TABLET: 8.6; 5 TABLET ORAL at 05:31

## 2019-11-06 RX ADMIN — AMOXICILLIN AND CLAVULANATE POTASSIUM 1 TABLET: 500; 125 TABLET, FILM COATED ORAL at 17:18

## 2019-11-06 RX ADMIN — HEPARIN SODIUM 5000 UNITS: 5000 INJECTION, SOLUTION INTRAVENOUS; SUBCUTANEOUS at 22:08

## 2019-11-06 RX ADMIN — HEPARIN SODIUM 5000 UNITS: 5000 INJECTION, SOLUTION INTRAVENOUS; SUBCUTANEOUS at 05:31

## 2019-11-06 RX ADMIN — SEVELAMER CARBONATE 1600 MG: 800 TABLET, FILM COATED ORAL at 11:07

## 2019-11-06 RX ADMIN — OXYCODONE HYDROCHLORIDE 5 MG: 5 TABLET ORAL at 17:18

## 2019-11-06 RX ADMIN — SEVELAMER CARBONATE 1600 MG: 800 TABLET, FILM COATED ORAL at 08:23

## 2019-11-06 RX ADMIN — OXYCODONE HYDROCHLORIDE 5 MG: 5 TABLET ORAL at 11:07

## 2019-11-06 ASSESSMENT — LIFESTYLE VARIABLES: SUBSTANCE_ABUSE: 1

## 2019-11-06 ASSESSMENT — ENCOUNTER SYMPTOMS
SPEECH CHANGE: 0
ABDOMINAL PAIN: 0
PALPITATIONS: 0
EYE DISCHARGE: 0
COUGH: 0
CHILLS: 0
WHEEZING: 0
VOMITING: 0
NERVOUS/ANXIOUS: 0
WEAKNESS: 0
BACK PAIN: 0
SHORTNESS OF BREATH: 1
NECK PAIN: 0
HALLUCINATIONS: 0
DIARRHEA: 0
EYE REDNESS: 0
DIAPHORESIS: 0
STRIDOR: 0
FOCAL WEAKNESS: 0
FEVER: 0
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
## 2019-11-06 NOTE — PROGRESS NOTES
Received report and assumed pt care at 1900 change of shift.  Pt is A&Ox4, on 3 LPM oxygen via nasal cannula and complains of headache and abdominal pain and waves of nausea but declines anti-nausea medication.  Pt found walking in hallway without oxygen; SPO2 83% and increased to 93% with oxygen.  Pt instructed to keep oxygen on especially when ambulating.

## 2019-11-06 NOTE — PROGRESS NOTES
Dr. Ferguson with nephrology called with updates on patient condition throughout day.  Updated on testing and lab work.  Telephone order for additional dose of 40mg IV lasix one time now (MD aware lasix was previously given at 1543 today).

## 2019-11-06 NOTE — CARE PLAN
Problem: Venous Thromboembolism (VTW)/Deep Vein Thrombosis (DVT) Prevention:  Goal: Patient will participate in Venous Thrombosis (VTE)/Deep Vein Thrombosis (DVT)Prevention Measures  Outcome: PROGRESSING AS EXPECTED  Intervention: Encourage ambulation/mobilization at level directed by Physical Therapy in collaboration with Interdisciplinary Team  Note:   Educated pt about early ambulation. Pt verbalized understanding.      Problem: Respiratory:  Goal: Respiratory status will improve  Outcome: PROGRESSING AS EXPECTED  Intervention: Assess and monitor pulmonary status  Note:   Pt oxygen level is 96% on 3 liter. No SOB. Echocardio order received.

## 2019-11-06 NOTE — DISCHARGE PLANNING
Anticipated Discharge Disposition: home no needs     Action: Discussed pt in IDT rounds. Per wound team, pt may need follow up wound care. Per MD, nephrology consulted r/t Cr levels. Pt on 3L nc, 0L at baseline.      Barriers to Discharge: medical clearance:  wound care, CORY, O2     Plan: access pt wound care needs, trend pt kidney function, wean O2      Length of Stay: 3

## 2019-11-06 NOTE — PROGRESS NOTES
Assumed care of pt at shift change. A/Ox4, discussed plan of care. Pt oxygen level is 96% on 3 liter. No SOB. Echocardio order received.denied pain. Dressing is C/D/I on chest and arm.        All needs met at this time. Bed in lowest position, treaded socks on, personal belongings and call light within reach, instructed to call for any assistance, hourly rounding in place.
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
## 2019-11-06 NOTE — CARE PLAN
Problem: Safety  Goal: Will remain free from falls  Note:   Continue to reinforce with pt to call and wait for stand by assistance before ambulating due to oxygen tubing and reports of pt's SPO2 decreasing with ambulation.  Pt agreed to reinforcement instruction but was found walking in hallway without oxygen.     Problem: Venous Thromboembolism (VTW)/Deep Vein Thrombosis (DVT) Prevention:  Goal: Patient will participate in Venous Thrombosis (VTE)/Deep Vein Thrombosis (DVT)Prevention Measures  Note:   Discussed therapeutic benefits to ordered VTE prevention therapy.  Previously pt refused but is now willing to receive Heparin injections.

## 2019-11-07 LAB
ALBUMIN SERPL BCP-MCNC: 3.8 G/DL (ref 3.2–4.9)
ALBUMIN/GLOB SERPL: 1.2 G/DL
ALP SERPL-CCNC: 50 U/L (ref 30–99)
ALT SERPL-CCNC: 46 U/L (ref 2–50)
ANION GAP SERPL CALC-SCNC: 16 MMOL/L (ref 0–11.9)
AST SERPL-CCNC: 24 U/L (ref 12–45)
BASOPHILS # BLD AUTO: 0.6 % (ref 0–1.8)
BASOPHILS # BLD: 0.08 K/UL (ref 0–0.12)
BILIRUB SERPL-MCNC: 0.7 MG/DL (ref 0.1–1.5)
BUN SERPL-MCNC: 80 MG/DL (ref 8–22)
CALCIUM SERPL-MCNC: 8.6 MG/DL (ref 8.5–10.5)
CHLORIDE SERPL-SCNC: 97 MMOL/L (ref 96–112)
CO2 SERPL-SCNC: 26 MMOL/L (ref 20–33)
CREAT SERPL-MCNC: 7.96 MG/DL (ref 0.5–1.4)
EOSINOPHIL # BLD AUTO: 0.45 K/UL (ref 0–0.51)
EOSINOPHIL NFR BLD: 3.6 % (ref 0–6.9)
ERYTHROCYTE [DISTWIDTH] IN BLOOD BY AUTOMATED COUNT: 38.3 FL (ref 35.9–50)
GLOBULIN SER CALC-MCNC: 3.1 G/DL (ref 1.9–3.5)
GLUCOSE SERPL-MCNC: 96 MG/DL (ref 65–99)
HCT VFR BLD AUTO: 36.9 % (ref 42–52)
HGB BLD-MCNC: 12.9 G/DL (ref 14–18)
IMM GRANULOCYTES # BLD AUTO: 0.07 K/UL (ref 0–0.11)
IMM GRANULOCYTES NFR BLD AUTO: 0.6 % (ref 0–0.9)
LYMPHOCYTES # BLD AUTO: 1.92 K/UL (ref 1–4.8)
LYMPHOCYTES NFR BLD: 15.4 % (ref 22–41)
MAGNESIUM SERPL-MCNC: 2.2 MG/DL (ref 1.5–2.5)
MCH RBC QN AUTO: 30.6 PG (ref 27–33)
MCHC RBC AUTO-ENTMCNC: 35 G/DL (ref 33.7–35.3)
MCV RBC AUTO: 87.4 FL (ref 81.4–97.8)
MONOCYTES # BLD AUTO: 1.45 K/UL (ref 0–0.85)
MONOCYTES NFR BLD AUTO: 11.6 % (ref 0–13.4)
NEUTROPHILS # BLD AUTO: 8.48 K/UL (ref 1.82–7.42)
NEUTROPHILS NFR BLD: 68.2 % (ref 44–72)
NRBC # BLD AUTO: 0 K/UL
NRBC BLD-RTO: 0 /100 WBC
PHOSPHATE SERPL-MCNC: 5.9 MG/DL (ref 2.5–4.5)
PLATELET # BLD AUTO: 251 K/UL (ref 164–446)
PMV BLD AUTO: 9.6 FL (ref 9–12.9)
POTASSIUM SERPL-SCNC: 3.2 MMOL/L (ref 3.6–5.5)
POTASSIUM SERPL-SCNC: 3.6 MMOL/L (ref 3.6–5.5)
PROT SERPL-MCNC: 6.9 G/DL (ref 6–8.2)
RBC # BLD AUTO: 4.22 M/UL (ref 4.7–6.1)
SODIUM SERPL-SCNC: 139 MMOL/L (ref 135–145)
WBC # BLD AUTO: 12.5 K/UL (ref 4.8–10.8)

## 2019-11-07 PROCEDURE — 84132 ASSAY OF SERUM POTASSIUM: CPT

## 2019-11-07 PROCEDURE — 700102 HCHG RX REV CODE 250 W/ 637 OVERRIDE(OP): Performed by: HOSPITALIST

## 2019-11-07 PROCEDURE — 700102 HCHG RX REV CODE 250 W/ 637 OVERRIDE(OP): Performed by: NURSE PRACTITIONER

## 2019-11-07 PROCEDURE — 36415 COLL VENOUS BLD VENIPUNCTURE: CPT

## 2019-11-07 PROCEDURE — 99232 SBSQ HOSP IP/OBS MODERATE 35: CPT | Performed by: HOSPITALIST

## 2019-11-07 PROCEDURE — 770006 HCHG ROOM/CARE - MED/SURG/GYN SEMI*

## 2019-11-07 PROCEDURE — 700111 HCHG RX REV CODE 636 W/ 250 OVERRIDE (IP): Performed by: HOSPITALIST

## 2019-11-07 PROCEDURE — A9270 NON-COVERED ITEM OR SERVICE: HCPCS | Performed by: HOSPITALIST

## 2019-11-07 PROCEDURE — A9270 NON-COVERED ITEM OR SERVICE: HCPCS | Performed by: NURSE PRACTITIONER

## 2019-11-07 PROCEDURE — 84100 ASSAY OF PHOSPHORUS: CPT

## 2019-11-07 PROCEDURE — 80053 COMPREHEN METABOLIC PANEL: CPT

## 2019-11-07 PROCEDURE — 83735 ASSAY OF MAGNESIUM: CPT

## 2019-11-07 PROCEDURE — 85025 COMPLETE CBC W/AUTO DIFF WBC: CPT

## 2019-11-07 RX ORDER — POTASSIUM CHLORIDE 20 MEQ/1
40 TABLET, EXTENDED RELEASE ORAL ONCE
Status: DISCONTINUED | OUTPATIENT
Start: 2019-11-07 | End: 2019-11-07

## 2019-11-07 RX ORDER — FUROSEMIDE 40 MG/1
40 TABLET ORAL ONCE
Status: COMPLETED | OUTPATIENT
Start: 2019-11-07 | End: 2019-11-07

## 2019-11-07 RX ORDER — POTASSIUM CHLORIDE 20 MEQ/1
40 TABLET, EXTENDED RELEASE ORAL ONCE
Status: COMPLETED | OUTPATIENT
Start: 2019-11-07 | End: 2019-11-07

## 2019-11-07 RX ADMIN — SENNOSIDES AND DOCUSATE SODIUM 2 TABLET: 8.6; 5 TABLET ORAL at 05:07

## 2019-11-07 RX ADMIN — FAMOTIDINE 20 MG: 20 TABLET ORAL at 05:07

## 2019-11-07 RX ADMIN — ACETAMINOPHEN 650 MG: 325 TABLET, FILM COATED ORAL at 13:55

## 2019-11-07 RX ADMIN — HEPARIN SODIUM 5000 UNITS: 5000 INJECTION, SOLUTION INTRAVENOUS; SUBCUTANEOUS at 13:49

## 2019-11-07 RX ADMIN — OXYCODONE HYDROCHLORIDE 2.5 MG: 5 TABLET ORAL at 17:45

## 2019-11-07 RX ADMIN — AMOXICILLIN AND CLAVULANATE POTASSIUM 1 TABLET: 500; 125 TABLET, FILM COATED ORAL at 17:16

## 2019-11-07 RX ADMIN — POTASSIUM CHLORIDE 40 MEQ: 1500 TABLET, EXTENDED RELEASE ORAL at 13:49

## 2019-11-07 RX ADMIN — OXYCODONE HYDROCHLORIDE 2.5 MG: 5 TABLET ORAL at 12:40

## 2019-11-07 RX ADMIN — OXYCODONE HYDROCHLORIDE 2.5 MG: 5 TABLET ORAL at 21:55

## 2019-11-07 RX ADMIN — HEPARIN SODIUM 5000 UNITS: 5000 INJECTION, SOLUTION INTRAVENOUS; SUBCUTANEOUS at 05:07

## 2019-11-07 RX ADMIN — FUROSEMIDE 40 MG: 40 TABLET ORAL at 13:49

## 2019-11-07 ASSESSMENT — ENCOUNTER SYMPTOMS
CHILLS: 0
WHEEZING: 0
FOCAL WEAKNESS: 0
DIARRHEA: 0
PALPITATIONS: 0
VOMITING: 0
HALLUCINATIONS: 0
NECK PAIN: 0
COUGH: 0
DIAPHORESIS: 0
BACK PAIN: 0
WEAKNESS: 0
FEVER: 0
SPEECH CHANGE: 0
SHORTNESS OF BREATH: 1
NERVOUS/ANXIOUS: 0
ABDOMINAL PAIN: 0

## 2019-11-07 ASSESSMENT — LIFESTYLE VARIABLES: SUBSTANCE_ABUSE: 1

## 2019-11-07 NOTE — PROGRESS NOTES
Pt asleep at change of shift and at time of assessment.  Pt easily awakens but fell back to sleep during assessment.  Pt A&Ox4,on 3 LPM oxygen via nasal cannula and complains of 1 out of 10 of abdominal and head discomfort.    Safety precautions in place; bed in lowest and locked position, pt wearing non skid socks, environment uncluttered and spill free and call light and personal items within reach.
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
## 2019-11-07 NOTE — DISCHARGE PLANNING
Anticipated Discharge Disposition: Home    Action: Discussed patient's needs during IDT rounds.  Patient is improving, still on O2 3 liters.  SW asked about possible wound care after d/c.  Dr Kaur feels patient can be educated on care and at this time no need for referral for wound care    Barriers to Discharge: medical clearance, weaning off of O2     Plan: continue to monitor for SW needs
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
## 2019-11-07 NOTE — CARE PLAN
Problem: Bowel/Gastric:  Goal: Normal bowel function is maintained or improved  Intervention: Educate patient and significant other/support system about diet, fluid intake, medications and activity to promote bowel function  Note:   Encouraged pt to increase activity and oral fluid intake to promote bowel movement as pt has not had bowel movement since 11/3/19.  Medication options discussed; pt received Miralax today.     Problem: Respiratory:  Goal: Respiratory status will improve  Intervention: Assess and monitor pulmonary status  Note:   Assess lung sounds and work of breathing.  Lung fields sound clearer compared to last night; lower lobes slightly diminished.
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
## 2019-11-08 ENCOUNTER — PATIENT OUTREACH (OUTPATIENT)
Dept: HEALTH INFORMATION MANAGEMENT | Facility: OTHER | Age: 24
End: 2019-11-08

## 2019-11-08 VITALS
SYSTOLIC BLOOD PRESSURE: 141 MMHG | HEIGHT: 65 IN | RESPIRATION RATE: 16 BRPM | BODY MASS INDEX: 31.4 KG/M2 | HEART RATE: 56 BPM | TEMPERATURE: 98.1 F | OXYGEN SATURATION: 90 % | DIASTOLIC BLOOD PRESSURE: 88 MMHG | WEIGHT: 188.49 LBS

## 2019-11-08 LAB
ACETONE SERPL-MCNC: <5 MG/DL
ALBUMIN SERPL BCP-MCNC: 4.1 G/DL (ref 3.2–4.9)
ALBUMIN/GLOB SERPL: 1.3 G/DL
ALP SERPL-CCNC: 53 U/L (ref 30–99)
ALT SERPL-CCNC: 48 U/L (ref 2–50)
ANION GAP SERPL CALC-SCNC: 13 MMOL/L (ref 0–11.9)
AST SERPL-CCNC: 30 U/L (ref 12–45)
BACTERIA BLD CULT: NORMAL
BACTERIA UR CULT: NORMAL
BASOPHILS # BLD AUTO: 0.5 % (ref 0–1.8)
BASOPHILS # BLD: 0.06 K/UL (ref 0–0.12)
BILIRUB SERPL-MCNC: 0.7 MG/DL (ref 0.1–1.5)
BUN SERPL-MCNC: 66 MG/DL (ref 8–22)
CALCIUM SERPL-MCNC: 9.5 MG/DL (ref 8.5–10.5)
CHLORIDE SERPL-SCNC: 99 MMOL/L (ref 96–112)
CO2 SERPL-SCNC: 27 MMOL/L (ref 20–33)
CREAT SERPL-MCNC: 5.18 MG/DL (ref 0.5–1.4)
EOSINOPHIL # BLD AUTO: 0.56 K/UL (ref 0–0.51)
EOSINOPHIL NFR BLD: 4.5 % (ref 0–6.9)
ERYTHROCYTE [DISTWIDTH] IN BLOOD BY AUTOMATED COUNT: 38.5 FL (ref 35.9–50)
GLOBULIN SER CALC-MCNC: 3.1 G/DL (ref 1.9–3.5)
GLUCOSE SERPL-MCNC: 102 MG/DL (ref 65–99)
HCT VFR BLD AUTO: 40 % (ref 42–52)
HGB BLD-MCNC: 14 G/DL (ref 14–18)
IMM GRANULOCYTES # BLD AUTO: 0.06 K/UL (ref 0–0.11)
IMM GRANULOCYTES NFR BLD AUTO: 0.5 % (ref 0–0.9)
LYMPHOCYTES # BLD AUTO: 3.04 K/UL (ref 1–4.8)
LYMPHOCYTES NFR BLD: 24.6 % (ref 22–41)
MAGNESIUM SERPL-MCNC: 2.1 MG/DL (ref 1.5–2.5)
MCH RBC QN AUTO: 31 PG (ref 27–33)
MCHC RBC AUTO-ENTMCNC: 35 G/DL (ref 33.7–35.3)
MCV RBC AUTO: 88.5 FL (ref 81.4–97.8)
MONOCYTES # BLD AUTO: 1.49 K/UL (ref 0–0.85)
MONOCYTES NFR BLD AUTO: 12 % (ref 0–13.4)
NEUTROPHILS # BLD AUTO: 7.17 K/UL (ref 1.82–7.42)
NEUTROPHILS NFR BLD: 57.9 % (ref 44–72)
NRBC # BLD AUTO: 0 K/UL
NRBC BLD-RTO: 0 /100 WBC
PHOSPHATE SERPL-MCNC: 4.6 MG/DL (ref 2.5–4.5)
PLATELET # BLD AUTO: 297 K/UL (ref 164–446)
PMV BLD AUTO: 9.2 FL (ref 9–12.9)
POTASSIUM SERPL-SCNC: 3.5 MMOL/L (ref 3.6–5.5)
PROT SERPL-MCNC: 7.2 G/DL (ref 6–8.2)
RBC # BLD AUTO: 4.52 M/UL (ref 4.7–6.1)
SIGNIFICANT IND 70042: NORMAL
SIGNIFICANT IND 70042: NORMAL
SITE SITE: NORMAL
SITE SITE: NORMAL
SODIUM SERPL-SCNC: 139 MMOL/L (ref 135–145)
SOURCE SOURCE: NORMAL
SOURCE SOURCE: NORMAL
WBC # BLD AUTO: 12.4 K/UL (ref 4.8–10.8)

## 2019-11-08 PROCEDURE — 700102 HCHG RX REV CODE 250 W/ 637 OVERRIDE(OP): Performed by: NURSE PRACTITIONER

## 2019-11-08 PROCEDURE — A6212 FOAM DRG <=16 SQ IN W/BORDER: HCPCS | Performed by: HOSPITALIST

## 2019-11-08 PROCEDURE — 80053 COMPREHEN METABOLIC PANEL: CPT

## 2019-11-08 PROCEDURE — 84100 ASSAY OF PHOSPHORUS: CPT

## 2019-11-08 PROCEDURE — 83735 ASSAY OF MAGNESIUM: CPT

## 2019-11-08 PROCEDURE — 99239 HOSP IP/OBS DSCHRG MGMT >30: CPT | Performed by: HOSPITALIST

## 2019-11-08 PROCEDURE — 85025 COMPLETE CBC W/AUTO DIFF WBC: CPT

## 2019-11-08 PROCEDURE — 36415 COLL VENOUS BLD VENIPUNCTURE: CPT

## 2019-11-08 PROCEDURE — 700102 HCHG RX REV CODE 250 W/ 637 OVERRIDE(OP): Performed by: HOSPITALIST

## 2019-11-08 PROCEDURE — A9270 NON-COVERED ITEM OR SERVICE: HCPCS | Performed by: HOSPITALIST

## 2019-11-08 PROCEDURE — A9270 NON-COVERED ITEM OR SERVICE: HCPCS | Performed by: NURSE PRACTITIONER

## 2019-11-08 RX ORDER — AMOXICILLIN AND CLAVULANATE POTASSIUM 500; 125 MG/1; MG/1
1 TABLET, FILM COATED ORAL EVERY 24 HOURS
Qty: 5 TAB | Refills: 0 | Status: SHIPPED | OUTPATIENT
Start: 2019-11-08 | End: 2019-11-13

## 2019-11-08 RX ORDER — ACETAMINOPHEN 325 MG/1
650 TABLET ORAL EVERY 6 HOURS PRN
Qty: 30 TAB | Refills: 0 | Status: SHIPPED | OUTPATIENT
Start: 2019-11-08

## 2019-11-08 RX ADMIN — FAMOTIDINE 20 MG: 20 TABLET ORAL at 05:42

## 2019-11-08 RX ADMIN — SENNOSIDES AND DOCUSATE SODIUM 2 TABLET: 8.6; 5 TABLET ORAL at 05:43

## 2019-11-08 NOTE — CARE PLAN
Problem: Knowledge Deficit  Goal: Knowledge of the prescribed therapeutic regimen will improve  Outcome: PROGRESSING AS EXPECTED  Note:   Educated pt about dressing care of the bit wounds. Pt verbalized understanding. Supplies ordered and given to pt.      Problem: Discharge Barriers/Planning  Goal: Patient's continuum of care needs will be met  Outcome: PROGRESSING AS EXPECTED  Intervention: Collaborate with Transitional Care Team and Interdisciplinary Team to meet discharge needs  Note:   Discussed with MD about discharge. Discharge order obtained.

## 2019-11-08 NOTE — CARE PLAN
Problem: Bowel/Gastric:  Goal: Normal bowel function is maintained or improved  Outcome: PROGRESSING AS EXPECTED  Note:   Educated on pt ambulation to facilitate bowel function and use of stool softeners.  Pt had BM this shift     Problem: Pain Management  Goal: Pain level will decrease to patient's comfort goal  Outcome: PROGRESSING SLOWER THAN EXPECTED  Note:   Educated on management of pain level with pain medication management plan. As well as repositioning and use of hot pack.

## 2019-11-08 NOTE — PROGRESS NOTES
Pt no longer wearing oxygen and has an SPO2 of 96% on room air with all lung fields clear.  Pt still complains of abdominal pain and medicated as per MAR.  Dressings to right inner upper arm and left chest off at start of shift.  During assessment, pt requested new dressings be placed with morning medication administration as pt wanted to get to sleep for the night.  Pt looking forward to possible discharge in morning.  During rounding checks, pt asleep with no signs of distress.
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
## 2019-11-08 NOTE — DISCHARGE INSTRUCTIONS
Do wound care and dressing changes as instructed by nursing.  Avoid ibuprofen, nonsteroidal anti-inflammatory medications.  Follow-up with lab tests as instructed in approximate 2 weeks.  Return to ER if increased wound pain, redness swelling fevers not feeling well  Discharge Instructions    Discharged to home by car with relative. Discharged via walking, hospital escort: Refused.  Special equipment needed: Not Applicable    Be sure to schedule a follow-up appointment with your primary care doctor or any specialists as instructed.     Discharge Plan:   Diet Plan: Discussed  Activity Level: Discussed  Smoking Cessation Offered: Patient Refused  Confirmed Follow up Appointment: Patient to Call and Schedule Appointment  Confirmed Symptoms Management: Discussed  Medication Reconciliation Updated: Yes  Influenza Vaccine Indication: Indicated: 9 to 64 years of age  Influenza Vaccine Given - only chart on this line when given: Influenza Vaccine Given (See MAR)    I understand that a diet low in cholesterol, fat, and sodium is recommended for good health. Unless I have been given specific instructions below for another diet, I accept this instruction as my diet prescription.   Other diet: Renal diet    Special Instructions: None    · Is patient discharged on Warfarin / Coumadin?   No     Acute Kidney Injury, Adult  Acute kidney injury (CORY) occurs when there is sudden (acute) damage to the kidneys. A small amount of kidney damage may not cause problems, but a large amount of damage may make it difficult or impossible for the kidneys to work the way they should. CORY may develop into long-lasting (chronic) kidney disease. Early detection and treatment of CORY may prevent kidney damage from becoming permanent or getting worse.  What are the causes?  Common causes of this condition include:  · A problem with blood flow to the kidneys. This may be caused by:  ¨ Blood loss.  ¨ Heart and blood vessel (cardiovascular)  disease.  ¨ Severe burns.  ¨ Liver disease.  · Direct damage to the kidneys. This may be caused by:  ¨ Some medicines.  ¨ A kidney infection.  ¨ Poisoning.  ¨ Being around or in contact with poisonous (toxic) substances.  ¨ A surgical wound.  ¨ A hard, direct force to the kidney area.  · A sudden blockage of urine flow. This may be caused by:  ¨ Cancer.  ¨ Kidney stones.  ¨ Enlarged prostate in males.  What are the signs or symptoms?  Symptoms develop slowly and may not be obvious until the kidney damage becomes severe. It is possible to have CORY for years without showing any symptoms. Symptoms of this condition can include:  · Swelling (edema) of the face, legs, ankles, or feet.  · Numbness, tingling, or loss of feeling (sensation) in the hands or feet.  · Tiredness (lethargy).  · Nausea or vomiting.  · Confusion or trouble concentrating.  · Problems with urination, such as:  ¨ Painful or burning feeling during urination.  ¨ Decreased urine production.  ¨ Frequent urination, especially at night.  ¨ Bloody urine.  · Muscle twitches and cramps, especially in the legs.  · Shortness of breath.  · Weakness.  · Constant itchiness.  · Loss of appetite.  · Metallic taste in the mouth.  · Trouble sleeping.  · Pale lining of the eyelids and surface of the eye (conjunctiva).  How is this diagnosed?  This condition may be diagnosed with various tests. Tests may include:  · Blood tests.  · Urine tests.  · Imaging tests.  · A test in which a sample of tissue is removed from the kidneys to be looked at under a microscope (kidney biopsy).  How is this treated?  Treatment of CORY varies depending on the cause and severity of the kidney damage. In mild cases, treatment may not be needed. The kidneys may heal on their own.  If CORY is more severe, your health care provider will treat the cause of the kidney damage, help the kidneys heal, and prevent problems from occurring. Severe cases may require a procedure to remove toxic wastes  from the body (dialysis) or surgery to repair kidney damage. Surgery may involve:  · Repair of a torn kidney.  · Removal of a urine flow obstruction.  Follow these instructions at home:  · Follow your prescribed diet.  · Take over-the-counter and prescription medicines only as told by your health care provider.  ¨ Do not take any new medicines unless approved by your health care provider. Many medicines can worsen your kidney damage.  ¨ Do not take any vitamin and mineral supplements unless approved by your health care provider. Many nutritional supplements can worsen your kidney damage.  ¨ The dose of some medicines that you take may need to be adjusted.  · Do not use any tobacco products, such as cigarettes, chewing tobacco, and e-cigarettes. If you need help quitting, ask your health care provider.  · Keep all follow-up visits as told by your health care provider. This is important.  · Keep track of your blood pressure. Report changes in your blood pressure as told by your health care provider.  · Achieve and maintain a healthy weight. If you need help with this, ask your health care provider.  · Start or continue an exercise plan. Try to exercise at least 30 minutes a day, 5 days a week.  · Stay current with immunizations as told by your health care provider.  Where to find more information:  · American Association of Kidney Patients: www.aakp.org  · National Kidney Foundation: www.kidney.org  · American Kidney Fund: www.akfinc.org  · [facility]: www.lifeoptions.org and www.kidneyschool.org  Contact a health care provider if:  · Your symptoms get worse.  · You develop new symptoms.  Get help right away if:  · You develop symptoms of end-stage kidney disease, which include:  ¨ Headaches.  ¨ Abnormally dark or light skin.  ¨ Numbness in the hands or feet.  ¨ Easy bruising.  ¨ Frequent hiccups.  ¨ Chest pain.  ¨ Shortness of breath.  ¨ End of menstruation in women.  · You have a  fever.  · You have decreased urine production.  · You have pain or bleeding when you urinate.  This information is not intended to replace advice given to you by your health care provider. Make sure you discuss any questions you have with your health care provider.  Document Released: 07/02/2012 Document Revised: 07/27/2017 Document Reviewed: 08/16/2013  [employer] Interactive Patient Education © 2017 [employer]    Lesión renal aguda en los adultos  (Acute Kidney Injury, Adult)  La lesión renal aguda ocurre cuando hay un daño repentino (troy) en los riñones. Un daño pequeño puede no causar problemas, mel un daño mayor puede impedir que los riñones funcionen correctamente. La lesión renal aguda puede derivar en javier enfermedad renal de larga duración (crónica). La detección temprana y el tratamiento de la lesión renal aguda pueden prevenir que el daño renal se convierta en permanente o empeore.  CAUSAS  Las causas más frecuentes de esta afección incluyen las siguientes:  · Problemas con el flujo sanguíneo a los riñones. Las causas pueden ser las siguientes:  ¨ Pérdida de yokasta.  ¨ Enfermedad en el corazón y los vasos sanguíneos (cardiovascular).  ¨ Quemaduras graves.  ¨ Enfermedad hepática.  · Lesión directa en los riñones. Las causas pueden ser las siguientes:  ¨ Algunos medicamentos.  ¨ Infección renal.  ¨ Intoxicaciones.  ¨ La cercanía o el contacto con sustancias venenosas (tóxicas).  ¨ Javier herida quirúrgica.  ¨ Javier fuerza directa e intensa sobre la ricardo de los riñones.  · La obstrucción repentina del flujo de orina. Las causas pueden ser las siguientes:  ¨ Cáncer.  ¨ Cálculos en el riñón.  ¨ La próstata agrandada en los hombres.  SIGNOS Y SÍNTOMAS  Los síntomas se desarrollan lentamente y pueden no ser evidentes hasta que el daño renal es grave. Es posible tener javier lesión renal aguda myles años sin presentar síntomas. Los síntomas de esta afección pueden incluir lo siguiente:  · Hinchazón (edema) en la  leonid, las piernas, los tobillos o los pies.  · Adormecimiento, hormigueo o pérdida de la sensibilidad en las vishal o en los pies.  · Cansancio (letargo).  · Náuseas o vómitos.  · Confusión o dificultad para concentrarse.  · Problemas en la micción, tales delano:  ¨ Sensación de dolor o ardor al orinar.  ¨ Disminución de la producción de orina.  ¨ Aumento de los deseos de orinar, especialmente por la noche.  ¨ Yokasta en la orina.  · Contracciones y calambres musculares, especialmente en las piernas.  · Falta de aire.  · Debilidad.  · Picazón permanente.  · Pérdida del apetito.  · Gusto metálico en la boca.  · Dificultad para dormir.  · Palidez en los párpados y la superficie del kike (conjuntiva).  DIAGNÓSTICO  Esta afección se puede diagnosticar mediante diversos estudios. Entre ellos:  · Análisis de yokasta.  · Análisis de orina.  · Estudios de diagnóstico por imágenes.  · Un estudio en el que se zve javier muestra de tejido de los riñones para analizar con un microscopio (biopsia renal).  TRATAMIENTO  El tratamiento de la lesión renal aguda varía según la causa y la gravedad del daño renal. En los casos leves, puede no ser necesario el tratamiento. Los riñones pueden curarse por sí solos.  Si la lesión renal aguda es más grave, el médico tratará la causa del daño renal, le dará un tratamiento para los riñones y tratará de prevenir futuros problemas. Los casos graves pueden requerir un procedimiento para eliminar los desechos tóxicos del cuerpo (diálisis) o javier cirugía para reparar el daño renal. La cirugía consiste en lo siguiente:  · Reparar un riñón dañado.  · Desobstruir el flujo de orina.  INSTRUCCIONES PARA EL CUIDADO EN EL HOGAR  · Siga la dieta que le chilel indicado.  · Redan los medicamentos de venta myla y los recetados solamente delano se lo haya indicado el médico.  ¨ No tome ningún medicamento nuevo a menos que lo haya autorizado el médico. Muchos medicamentos pueden empeorar el daño renal.  ¨ No tome ningún  suplemento vitamínico o mineral a menos que lo haya autorizado el médico. Muchos suplementos nutricionales pueden empeorar el daño renal.  ¨ Es posible que sea necesario ajustar la dosis de algunos medicamentos que zev.  · No consuma ningún producto que contenga tabaco, lo que incluye cigarrillos, tabaco de mascar y cigarrillos electrónicos. Si necesita ayuda para dejar de fumar, consulte al médico.  · Concurra a todas las visitas de control delano se lo haya indicado el médico. [place] es importante.  · Lleve un control de la presión arterial. Informe al médico los cambios en la presión arterial delano radha se lo haya indicado.  · Alcance y mantenga un peso saludable. Si necesita ayuda para lograrlo, consulte a thornton médico.  · Comience o continúe un plan de ejercicios. Intente hacer ejercicios al menos 30 minutos al día, 5 días a la semana.  · Esté al día con las vacunas delano se lo haya indicado el médico.  SOLICITE ATENCIÓN MÉDICA SI:  · Los síntomas empeoran.  · Presenta nuevos síntomas.  SOLICITE ATENCIÓN MÉDICA DE INMEDIATO SI:  · Aparecen síntomas de enfermedad renal en etapa terminal, por ejemplo:  ¨ Neva de humberto.  ¨ La piel se oscurece o se aclara de manera anormal.  ¨ Entumecimiento en las vishal o en los pies.  ¨ Aparecen hematomas con facilidad.  ¨ Hipo frecuente.  ¨ Dolor en el pecho.  ¨ Falta de aire.  ¨ Falta de período menstrual en las mujeres.  · Tiene fiebre.  · Disminuye la producción de orina.  · Siente dolor o tiene javier hemorragia al orinar.  PARA OBTENER MÁS INFORMACIÓN  · Asociación Americana de Pacientes Renales (American Association of Kidney Patients, AAKP): www.aakp.org  · Fundación Nacional del Riñón (National Kidney Foundation): www.kidney.org  · Fondo Americano para Problemas Renales (American Kidney Fund): www.akfinc.org  · Programa de rehabilitación de Life Options: www.lifeoptions.org y www.kidneyschool.org  Esta información no tiene delano fin reemplazar el consejo del médico. Asegúrese de  hacerle al médico cualquier pregunta que tenga.  Document Released: 12/04/2013 Document Revised: 04/10/2017 Document Reviewed: 08/16/2013  [employer] Interactive Patient Education © 2017 [employer]      Cocaine Abuse  PROBLEMS FROM USING COCAINE:   · Highly addictive.   · Illegal.   · Risk of sudden death.   · Heart disease.   · Irregular heart beat.   · High blood pressure.   · Damage to nose and lungs.   · Severe agitation.   · Hallucinations.   · Violent behavior.   · Paranoia.   · Sexual dysfunction.   Most cocaine users deny that they have a problem with addiction. The biggest problem is admitting that you are dependent on cocaine. Those trying to quit using it may experience depression and withdrawal symptoms.  Other withdrawal symptoms include fatigue, suicidal thoughts, sleepiness, restlessness, anxiety, and increased craving for cocaine.  There are medications available to help prevent depression associated with stopping cocaine. Most users will find a support group or treatment program helpful in coming off and staying off cocaine. The best chance to cure cocaine addiction is to go into group therapy and to be in a drug-free environment. It is very important to develop healthy relationships and avoid socializing with people who use or deal drugs.  Eat well, and give your body the proper rest and healthy exercise it needs. You may need medication to help treat withdrawal symptoms. Call your caregiver or a drug treatment center for more help.   You may also want to call the National [place] on Drug Abuse at 710-392-HELP in the [place]  SEEK IMMEDIATE MEDICAL CARE IF:  · You develop severe chest pain.   · You develop shortness of breath.   · You develop extreme agitation.   Document Released: 01/25/2006 Document Revised: 03/11/2013 Document Reviewed: 10/20/2010  ExitCare® Patient Information ©2013 [employer].  Adicción A La Cocaína  (Cocaine Abuse)  PROBLEMAS QUE PRODUCE EL CONSUMO DE COCAÍNA:   · Es  altamente adictiva.   · Es ilegal.   · Riesgo de muerte súbita.   · Enfermedades cardíacas.   · Latidos cardíacos irregulares   · Hipertensión arterial.   · Daños a la nariz y los pulmones.   · Agitación grave.   · Alucinaciones.   · Conductas violentas.   · Paranoia.   · Disfunción sexual.   La mayoría de los consumidores de cocaína niegan tener un problema de adicción. El mayor problema es comprender que tiene javier adicción a la cocaína. Aquellos que tratan de dejarla pueden experimentar depresión y síntomas de abstinencia.  Otros síntomas de abstinencia incluyen fatiga, pensamientos suicidas, somnolencia, agitación, ansiedad y deseo de consumir cocaína.  También se dispone de medicamentos que ayudan a evitar la depresión asociada con la interrupción del consumo de cocaína. La mejor opción para salir de la adicción a la cocaína es concurrir a terapia grupal. La mejor manera de curar la adicción a la cocaína es ir a un mireya terapéutico y estar en un ambiente myla de drogas. Es muy importante relacionarse con personas sanas y evitar socializar con personas que consumen o comercian con drogas.  Coma boris, descanse lo suficiente y ejercítese. Es posible que deba utilizar medicamentos para ayudar a tratar los síntomas de abstinencia. Comuníquese con el profesional que lo asiste o con un centro de tratamiento de adicciones para obtener más ayuda.   También puede comunicarse con el National [place] on Drug Abuse al número 800-662-HELP en los [place]  SOLICITE ATENCIÓN MÉDICA DE INMEDIATO SI TIENE:  · Dolor radu en el pecho.   · Falta de aire.   · Agitación extrema.   Document Released: 12/18/2006 Document Revised: 03/11/2013  ExitCare® Patient Information ©2013 [employer].  Depression / Suicide Risk    As you are discharged from this [facility] facility, it is important to learn how to keep safe from harming yourself.    Recognize the warning signs:  · Abrupt changes in personality, positive or negative-  including increase in energy   · Giving away possessions  · Change in eating patterns- significant weight changes-  positive or negative  · Change in sleeping patterns- unable to sleep or sleeping all the time   · Unwillingness or inability to communicate  · Depression  · Unusual sadness, discouragement and loneliness  · Talk of wanting to die  · Neglect of personal appearance   · Rebelliousness- reckless behavior  · Withdrawal from people/activities they love  · Confusion- inability to concentrate     If you or a loved one observes any of these behaviors or has concerns about self-harm, here's what you can do:  · Talk about it- your feelings and reasons for harming yourself  · Remove any means that you might use to hurt yourself (examples: pills, rope, extension cords, firearm)  · Get professional help from the community (Mental Health, Substance Abuse, psychological counseling)  · Do not be alone:Call your Safe Contact- someone whom you trust who will be there for you.  · Call your local CRISIS HOTLINE 675-1253 or 744-099-4021  · Call your local Children's Mobile Crisis Response Team [place] (991) 415-6343 or www.[employer]  · Call the toll free National Suicide Prevention Hotlines   · National Suicide Prevention Lifeline 446-186-HDFC (1124)  · National Hope Line Network 800-SUICIDE (117-3111)
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
## 2019-11-08 NOTE — PROGRESS NOTES
Pt was on 3 liter at the beginning of shift. Order to wean down oxygen. Weaned oxygen down to 2 liter . Pt oxygen level was 96%. Weaned wean down to 1 liter. Oxygen level was 945. Weaned oxygen. Pt oxygen level was 915. Educated pt to use incentive spirometer every hours while awake. Pt demonstrated use.

## 2019-11-08 NOTE — CARE PLAN
Problem: Respiratory:  Goal: Respiratory status will improve  Note:   Auscultate lung sounds for assessing; pt lungs clear in all fields.  Intervention: Educate and encourage incentive spirometry usage  Note:   Encourage pt to regularly perform incentive spirometry during waking hours.     Problem: Respiratory:  Goal: Respiratory status will improve  Intervention: Educate and encourage incentive spirometry usage  Note:   Encourage pt to regularly perform incentive spirometry during waking hours.

## 2019-11-08 NOTE — PROGRESS NOTES
Pt A&OX4. Currently on RA. Pulse ox 95% and above. BM today. Safety precautions in place. Needs are met.
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
## 2019-11-09 LAB
BACTERIA BLD CULT: NORMAL
SIGNIFICANT IND 70042: NORMAL
SITE SITE: NORMAL
SOURCE SOURCE: NORMAL

## (undated) DEVICE — CORDS BIPOLAR COAGULATION - 12FT STERILE DISP. (10EA/BX)

## (undated) DEVICE — DRAPE LARGE 3 QUARTER - (20/CA)

## (undated) DEVICE — PACK MINOR BASIN - (2EA/CA)

## (undated) DEVICE — KIT SURGIFLO W/OUT THROMBIN - (6EA/CA)

## (undated) DEVICE — BOVIE BLADE COATED &INSULATED - 25/PK

## (undated) DEVICE — PROTECTOR ULNA NERVE - (36PR/CA)

## (undated) DEVICE — GLOVE BIOGEL SZ 6.5 SURGICAL PF LTX (50PR/BX 4BX/CA)

## (undated) DEVICE — GLOVE BIOGEL INDICATOR SZ 7SURGICAL PF LTX - (50/BX 4BX/CA)

## (undated) DEVICE — NEPTUNE 4 PORT MANIFOLD - (20/PK)

## (undated) DEVICE — CANISTER SUCTION 3000ML MECHANICAL FILTER AUTO SHUTOFF MEDI-VAC NONSTERILE LF DISP  (40EA/CA)

## (undated) DEVICE — MEDICINE CUP STERILE 2 OZ - (100/CA)

## (undated) DEVICE — STAPLER SKIN DISP - (6/BX 10BX/CA) VISISTAT

## (undated) DEVICE — SUCTION INSTRUMENT YANKAUER BULBOUS TIP W/O VENT (50EA/CA)

## (undated) DEVICE — SUTURE 4-0 PROLENE PS-2 18 (36PK/BX)"

## (undated) DEVICE — PENCIL ELECTSURG 10FT BTN SWH - (50/CA)

## (undated) DEVICE — LACTATED RINGERS INJ 1000 ML - (14EA/CA 60CA/PF)

## (undated) DEVICE — GLOVE BIOGEL ECLIPSE  PF LATEX SIZE 6.5 (50PR/BX)

## (undated) DEVICE — DETERGENT RENUZYME PLUS 10 OZ PACKET (50/BX)

## (undated) DEVICE — SPONGE TONSIL LARGE XRAY STERILE - (5/PK 20PK/CA)

## (undated) DEVICE — HEAD HOLDER JUNIOR/ADULT

## (undated) DEVICE — ELECTRODE 850 FOAM ADHESIVE - HYDROGEL RADIOTRNSPRNT (50/PK)

## (undated) DEVICE — GLOVE BIOGEL ECLIPSE PF LATEX SIZE 7.5

## (undated) DEVICE — KIT ANESTHESIA W/CIRCUIT & 3/LT BAG W/FILTER (20EA/CA)

## (undated) DEVICE — SODIUM CHL IRRIGATION 0.9% 1000ML (12EA/CA)

## (undated) DEVICE — GLOVE BIOGEL SZ 6 PF LATEX - (50EA/BX 4BX/CA)

## (undated) DEVICE — NEEDLE SPINAL NON-SAFETY 25GA X 3 (25EA/BX)"

## (undated) DEVICE — TOWELS CLOTH SURGICAL - (4/PK 20PK/CA)

## (undated) DEVICE — TUBING CLEARLINK DUO-VENT - C-FLO (48EA/CA)

## (undated) DEVICE — CHLORAPREP 26 ML APPLICATOR - ORANGE TINT(25/CA)

## (undated) DEVICE — KIT ROOM DECONTAMINATION

## (undated) DEVICE — SWAB ANAEROBIC SPEC.COLLECTOR - (25/PK 4PK/CA 100EA/CA)

## (undated) DEVICE — GLOVE BIOGEL SZ 7.5 SURGICAL PF LTX - (50PR/BX 4BX/CA)

## (undated) DEVICE — DRAPE LAPAROTOMY T SHEET - (12EA/CA)

## (undated) DEVICE — Device

## (undated) DEVICE — PACK ENT OR - (2EA/CA)

## (undated) DEVICE — GOWN WARMING STANDARD FLEX - (30/CA)

## (undated) DEVICE — MASK ANESTHESIA ADULT  - (100/CA)

## (undated) DEVICE — GLOVE BIOGEL PI INDICATOR SZ 8.0 SURGICAL PF LF -(50/BX 4BX/CA)

## (undated) DEVICE — SENSOR SPO2 NEO LNCS ADHESIVE (20/BX) SEE USER NOTES

## (undated) DEVICE — GLOVE BIOGEL INDICATOR SZ 7.5 SURGICAL PF LTX - (50PR/BX 4BX/CA)

## (undated) DEVICE — SYRINGE 10 ML CONTROL LL (25EA/BX 4BX/CA)

## (undated) DEVICE — ELECTRODE DUAL RETURN W/ CORD - (50/PK)

## (undated) DEVICE — TUBE, CULTURE AEROBIC

## (undated) DEVICE — SUTURE GENERAL

## (undated) DEVICE — BLADE SURGICAL #15 - (50/BX 3BX/CA)

## (undated) DEVICE — BOVIE BLADE COATED - (50/PK)

## (undated) DEVICE — ANTI-FOG SOLUTION - 60BTL/CA

## (undated) DEVICE — GLOVE BIOGEL ECLIPSE PF LATEX SIZE 9.0

## (undated) DEVICE — SPONGE GAUZESTER 4 X 4 4PLY - (128PK/CA)

## (undated) DEVICE — SET EXTENSION WITH 2 PORTS (48EA/CA) ***PART #2C8610 IS A SUBSTITUTE*****

## (undated) DEVICE — SET LEADWIRE 5 LEAD BEDSIDE DISPOSABLE ECG (1SET OF 5/EA)